# Patient Record
Sex: FEMALE | Race: WHITE | NOT HISPANIC OR LATINO | Employment: OTHER | URBAN - NONMETROPOLITAN AREA
[De-identification: names, ages, dates, MRNs, and addresses within clinical notes are randomized per-mention and may not be internally consistent; named-entity substitution may affect disease eponyms.]

---

## 2020-01-20 ENCOUNTER — OFFICE VISIT (OUTPATIENT)
Dept: OBSTETRICS AND GYNECOLOGY | Facility: CLINIC | Age: 65
End: 2020-01-20

## 2020-01-20 VITALS
SYSTOLIC BLOOD PRESSURE: 118 MMHG | HEIGHT: 63 IN | DIASTOLIC BLOOD PRESSURE: 72 MMHG | BODY MASS INDEX: 26.05 KG/M2 | WEIGHT: 147 LBS

## 2020-01-20 DIAGNOSIS — N95.1 MENOPAUSAL SYMPTOMS: Primary | ICD-10-CM

## 2020-01-20 DIAGNOSIS — R68.82 DECREASED LIBIDO: ICD-10-CM

## 2020-01-20 DIAGNOSIS — Z79.890 HORMONE REPLACEMENT THERAPY (HRT): ICD-10-CM

## 2020-01-20 DIAGNOSIS — N95.2 ATROPHIC VAGINITIS: ICD-10-CM

## 2020-01-20 DIAGNOSIS — Z78.9 NON-SMOKER: ICD-10-CM

## 2020-01-20 PROCEDURE — 99203 OFFICE O/P NEW LOW 30 MIN: CPT | Performed by: OBSTETRICS & GYNECOLOGY

## 2020-01-20 RX ORDER — ESTRADIOL AND NORETHINDRONE ACETATE 1; .5 MG/1; MG/1
1 TABLET, FILM COATED ORAL DAILY
COMMUNITY
Start: 2020-01-13 | End: 2020-01-20 | Stop reason: SDUPTHER

## 2020-01-20 RX ORDER — ESTRADIOL AND NORETHINDRONE ACETATE 1; .5 MG/1; MG/1
1 TABLET, FILM COATED ORAL DAILY
Qty: 90 TABLET | Refills: 1 | Status: SHIPPED | OUTPATIENT
Start: 2020-01-20 | End: 2020-02-05 | Stop reason: ALTCHOICE

## 2020-01-20 RX ORDER — HYDROXYZINE PAMOATE 25 MG/1
CAPSULE ORAL
COMMUNITY
Start: 2020-01-13 | End: 2022-05-06 | Stop reason: SDUPTHER

## 2020-01-20 RX ORDER — ESTRADIOL 10 UG/1
1 TABLET VAGINAL 2 TIMES WEEKLY
COMMUNITY
Start: 2019-12-29 | End: 2020-01-20

## 2020-01-20 RX ORDER — ESOMEPRAZOLE MAGNESIUM 40 MG/1
CAPSULE, DELAYED RELEASE ORAL
COMMUNITY
Start: 2020-01-04 | End: 2022-05-06 | Stop reason: SDUPTHER

## 2020-01-20 NOTE — PROGRESS NOTES
"Subjective   Kathe Cohen is a 64 y.o. female is here today as a self referral.    Patient presents, self-referred, to discuss hormonal replacement therapy.    History of Present Illness     She is well controlled in terms of her vasomotor symptoms on her current regimen.  However, she does have decreased libido.  She also feels that her vagina is \"\" floppy\"  She has no classic prolapse symptoms or incontinence.    The following portions of the patient's history were reviewed and updated as appropriate: allergies, current medications, past family history, past medical history, past social history, past surgical history and problem list.    Review of Systems   All other systems reviewed and are negative.      Objective   Physical Exam   Constitutional: She is oriented to person, place, and time. She appears well-developed and well-nourished.   HENT:   Head: Normocephalic and atraumatic.   Neck: Normal range of motion. Neck supple.   Abdominal: Soft. Bowel sounds are normal.   Genitourinary: Vagina normal and uterus normal. No vaginal discharge found.   Musculoskeletal: Normal range of motion.   Neurological: She is alert and oriented to person, place, and time.   Skin: Skin is warm and dry.   Psychiatric: She has a normal mood and affect. Her behavior is normal. Judgment and thought content normal.   Nursing note and vitals reviewed.        Assessment/Plan   Kathe was seen today for establish care.    Diagnoses and all orders for this visit:    Menopausal symptoms    Hormone replacement therapy (HRT)    Atrophic vaginitis    Decreased libido    Non-smoker    Other orders  -     conjugated estrogens (PREMARIN) 0.625 MG/GM vaginal cream; Insert  into the vagina 2 (Two) Times a Week.  -     MIMVEY 1-0.5 MG per tablet; Take 1 tablet by mouth Daily.      Continue combination HRT.  Referral to compound pharmacy for consideration of testosterone supplementation.  Change vaginal estrogen to cream.  Return office in 3 " months.  Call for concerns or questions.    Mikey Marx MD

## 2020-02-05 ENCOUNTER — TELEPHONE (OUTPATIENT)
Dept: OBSTETRICS AND GYNECOLOGY | Facility: CLINIC | Age: 65
End: 2020-02-05

## 2020-02-05 ENCOUNTER — DOCUMENTATION (OUTPATIENT)
Dept: OBSTETRICS AND GYNECOLOGY | Facility: CLINIC | Age: 65
End: 2020-02-05

## 2020-02-05 NOTE — TELEPHONE ENCOUNTER
Received recommendation from  Pharmacy per fax re: compounded hormone cream    Order okayed per provider to stop current premarin and mimvey and use recommendations per pharmacy.  Pt is called and notified

## 2020-04-15 ENCOUNTER — OFFICE VISIT (OUTPATIENT)
Dept: OBSTETRICS AND GYNECOLOGY | Facility: CLINIC | Age: 65
End: 2020-04-15

## 2020-04-15 DIAGNOSIS — R68.82 DECREASED LIBIDO: ICD-10-CM

## 2020-04-15 DIAGNOSIS — Z79.890 HORMONE REPLACEMENT THERAPY (HRT): ICD-10-CM

## 2020-04-15 DIAGNOSIS — N95.1 MENOPAUSAL SYMPTOMS: Primary | ICD-10-CM

## 2020-04-15 DIAGNOSIS — N95.2 ATROPHIC VAGINITIS: ICD-10-CM

## 2020-04-15 DIAGNOSIS — Z78.9 NON-SMOKER: ICD-10-CM

## 2020-04-15 DIAGNOSIS — N95.1 MENOPAUSAL STATE: Primary | ICD-10-CM

## 2020-04-15 PROCEDURE — 99213 OFFICE O/P EST LOW 20 MIN: CPT | Performed by: OBSTETRICS & GYNECOLOGY

## 2020-04-15 RX ORDER — CETIRIZINE HYDROCHLORIDE 10 MG/1
10 TABLET ORAL
COMMUNITY
End: 2022-05-06 | Stop reason: SDUPTHER

## 2020-04-15 NOTE — PROGRESS NOTES
Subjective   Kathe Cohen is a 64 y.o. female is here today for follow-up.    Patient presents today to discuss hormones.    History of Present Illness     This is done via telephone visit due to connectivity issues.  Previous office note is reviewed.  She is now using all compounded bioidentical hormones through [facility].  She has significant improvement of all of her symptoms.  She has no breast symptoms.  She is due for mammogram.    The following portions of the patient's history were reviewed and updated as appropriate: allergies, current medications, past family history, past medical history, past social history, past surgical history and problem list.    Review of Systems   All other systems reviewed and are negative.      Objective   Physical Exam   Constitutional: She is oriented to person, place, and time.   Neurological: She is alert and oriented to person, place, and time.   Psychiatric: She has a normal mood and affect. Her behavior is normal. Judgment and thought content normal.         Assessment/Plan   Kathe was seen today for menopause.    Diagnoses and all orders for this visit:    Menopausal symptoms  -     Mammo Screening Digital Tomosynthesis Bilateral With CAD; Future    Hormone replacement therapy (HRT)    Atrophic vaginitis    Decreased libido    Non-smoker    Will continue current regimen.  Call for concerns or questions.  Return office in 3 months.    You have chosen to receive care through a telephone visit. Do you consent to use a telephone visit for your medical care today? Yes  This visit has been rescheduled as a phone visit to comply with patient safety concerns in accordance with CDC recommendations. Total time of discussion was 20 minutes.    Mikey Marx MD

## 2020-05-06 ENCOUNTER — TELEPHONE (OUTPATIENT)
Dept: OBSTETRICS AND GYNECOLOGY | Facility: CLINIC | Age: 65
End: 2020-05-06

## 2020-05-06 DIAGNOSIS — N95.1 MENOPAUSAL STATE: ICD-10-CM

## 2020-05-06 NOTE — TELEPHONE ENCOUNTER
[facility] sent four requests for pts four compounded hormones to be filled. Her last visit with you was on 04/15/2020. Jaime has been pulled. Is it ok to fill those?

## 2020-07-07 ENCOUNTER — TELEPHONE (OUTPATIENT)
Dept: OBSTETRICS AND GYNECOLOGY | Facility: CLINIC | Age: 65
End: 2020-07-07

## 2020-07-07 DIAGNOSIS — N95.1 MENOPAUSAL STATE: ICD-10-CM

## 2020-07-07 NOTE — TELEPHONE ENCOUNTER
Spoke with Sharmin at [facility]. Informed her that Dr Marx approved the hormone increase with three refills. Increased dosage charted.

## 2020-09-04 ENCOUNTER — TELEPHONE (OUTPATIENT)
Dept: OBSTETRICS AND GYNECOLOGY | Facility: CLINIC | Age: 65
End: 2020-09-04

## 2020-09-04 NOTE — TELEPHONE ENCOUNTER
Sharmin from [facility] calls in regards to pt's HRT refills. Informed Sharmin Marx has only approved 3 refills and pt will need to make an apt for yearly before more refills can be sent. Sharmin will notify pt when pt picks up rx today.

## 2020-09-30 ENCOUNTER — OFFICE VISIT (OUTPATIENT)
Dept: OBSTETRICS AND GYNECOLOGY | Facility: CLINIC | Age: 65
End: 2020-09-30

## 2020-09-30 VITALS
BODY MASS INDEX: 26.93 KG/M2 | DIASTOLIC BLOOD PRESSURE: 80 MMHG | WEIGHT: 152 LBS | SYSTOLIC BLOOD PRESSURE: 120 MMHG | HEIGHT: 63 IN

## 2020-09-30 DIAGNOSIS — N95.1 MENOPAUSAL SYMPTOMS: Primary | ICD-10-CM

## 2020-09-30 DIAGNOSIS — Z79.890 HORMONE REPLACEMENT THERAPY (HRT): ICD-10-CM

## 2020-09-30 PROCEDURE — 99212 OFFICE O/P EST SF 10 MIN: CPT | Performed by: OBSTETRICS & GYNECOLOGY

## 2020-09-30 NOTE — PROGRESS NOTES
Subjective   Kathe Cohen is a 64 y.o. female is here today for follow-up.    Patient presents today to discuss hormone replacement therapy    History of Present Illness     She is having good results with bioidentical hormones through [facility].  She wishes to continue.  She is having no side effects.    They did add testosterone cream 3 months ago due to delayed orgasm.  She is noting some improvement on that.    The following portions of the patient's history were reviewed and updated as appropriate: allergies, current medications, past family history, past medical history, past social history, past surgical history and problem list.    Review of Systems   All other systems reviewed and are negative.      Objective   Physical Exam  Vitals signs and nursing note reviewed.   Constitutional:       Appearance: Normal appearance.   HENT:      Head: Normocephalic and atraumatic.   Neck:      Musculoskeletal: Normal range of motion and neck supple.   Cardiovascular:      Rate and Rhythm: Normal rate and regular rhythm.      Pulses: Normal pulses.      Heart sounds: Normal heart sounds.   Pulmonary:      Effort: Pulmonary effort is normal.      Breath sounds: Normal breath sounds.   Abdominal:      General: Abdomen is flat.      Palpations: Abdomen is soft.   Musculoskeletal: Normal range of motion.   Skin:     General: Skin is warm and dry.   Neurological:      General: No focal deficit present.      Mental Status: She is alert and oriented to person, place, and time.   Psychiatric:         Mood and Affect: Mood normal.         Behavior: Behavior normal.         Thought Content: Thought content normal.         Judgment: Judgment normal.           Assessment/Plan   Kathe was seen today for medication check.    Diagnoses and all orders for this visit:    Menopausal symptoms    Hormone replacement therapy (HRT)      Continue same hormone replacement therapy regimen.  Schedule mammogram.  Return office in 6 months for  annual exam.    Mikey Marx MD

## 2020-10-01 DIAGNOSIS — N95.1 MENOPAUSAL STATE: ICD-10-CM

## 2020-10-02 DIAGNOSIS — N95.1 MENOPAUSAL STATE: ICD-10-CM

## 2020-10-02 NOTE — TELEPHONE ENCOUNTER
Sharmin from [facility] calls in regards to pt's compounds. After speaking with Amairani Concepcion, will call in RF's x3 for testosterone and HRT based on office note from 9/30/2020.

## 2020-10-12 DIAGNOSIS — N95.1 MENOPAUSAL STATE: ICD-10-CM

## 2020-10-16 DIAGNOSIS — N95.1 MENOPAUSAL STATE: ICD-10-CM

## 2020-10-16 NOTE — TELEPHONE ENCOUNTER
Called and spoke to Marysol at [facility] and informed her it was okay to have three extra vaginal inserts and increase testosterone to 1mg and Oxytocin 60u, voiced understanding. Pt informed, voiced understanding.

## 2020-10-22 ENCOUNTER — TELEPHONE (OUTPATIENT)
Dept: OBSTETRICS AND GYNECOLOGY | Facility: CLINIC | Age: 65
End: 2020-10-22

## 2020-11-23 DIAGNOSIS — N95.1 MENOPAUSAL STATE: ICD-10-CM

## 2020-11-23 NOTE — TELEPHONE ENCOUNTER
Pt has mammogram scheduled December 2020 at [facility], but needs refills on hormone creams until then.

## 2020-12-15 ENCOUNTER — TELEPHONE (OUTPATIENT)
Dept: OBSTETRICS AND GYNECOLOGY | Facility: CLINIC | Age: 65
End: 2020-12-15

## 2020-12-15 DIAGNOSIS — Z12.31 ENCOUNTER FOR SCREENING MAMMOGRAM FOR BREAST CANCER: ICD-10-CM

## 2020-12-15 DIAGNOSIS — Z12.31 ENCOUNTER FOR SCREENING MAMMOGRAM FOR BREAST CANCER: Primary | ICD-10-CM

## 2020-12-15 NOTE — TELEPHONE ENCOUNTER
Patient is scheduled for her mammogram at [facility], they have called requesting an order for patient to have completed. Patient appointment is scheduled for 930 today at [facility].  [facility] [phone] opt 3  Fax: [phone]

## 2020-12-22 DIAGNOSIS — N95.1 MENOPAUSAL STATE: ICD-10-CM

## 2021-01-04 ENCOUNTER — TELEPHONE (OUTPATIENT)
Dept: OBSTETRICS AND GYNECOLOGY | Facility: CLINIC | Age: 66
End: 2021-01-04

## 2021-01-06 NOTE — TELEPHONE ENCOUNTER
Please check on this.   The medication record indicates Dr. Marx prescribed medication with refills in late 12/2020

## 2021-01-12 ENCOUNTER — OFFICE VISIT (OUTPATIENT)
Dept: OBSTETRICS AND GYNECOLOGY | Facility: CLINIC | Age: 66
End: 2021-01-12

## 2021-01-12 VITALS
SYSTOLIC BLOOD PRESSURE: 102 MMHG | DIASTOLIC BLOOD PRESSURE: 66 MMHG | HEIGHT: 63 IN | WEIGHT: 158 LBS | BODY MASS INDEX: 28 KG/M2

## 2021-01-12 DIAGNOSIS — Z78.9 NON-SMOKER: ICD-10-CM

## 2021-01-12 DIAGNOSIS — Z79.890 HORMONE REPLACEMENT THERAPY (HRT): ICD-10-CM

## 2021-01-12 DIAGNOSIS — N95.1 MENOPAUSAL STATE: Primary | ICD-10-CM

## 2021-01-12 PROCEDURE — 99213 OFFICE O/P EST LOW 20 MIN: CPT | Performed by: NURSE PRACTITIONER

## 2021-01-12 NOTE — PROGRESS NOTES
"Subjective   Kathe Cohen is a 65 y.o. female.     Follow up  HRT  Compounded from  pharmacy      The following portions of the patient's history were reviewed and updated as appropriate: allergies, current medications, past family history, past medical history, past social history, past surgical history and problem list.    /66   Ht 160 cm (63\")   Wt 71.7 kg (158 lb)   BMI 27.99 kg/m²     Review of Systems   Constitutional: Negative for activity change, appetite change, fatigue and fever.   Respiratory: Negative for apnea and shortness of breath.    Cardiovascular: Negative for chest pain and palpitations.   Gastrointestinal: Negative for abdominal distention, abdominal pain, constipation, diarrhea, nausea and vomiting.   Endocrine: Negative for cold intolerance and heat intolerance.   Genitourinary: Negative for difficulty urinating, frequency, menstrual problem, pelvic pain, vaginal discharge and vaginal pain.   Neurological: Negative for headaches.   Psychiatric/Behavioral: Negative for agitation and sleep disturbance.       Objective   Physical Exam  Vitals signs reviewed.   Constitutional:       Appearance: She is well-developed.   Eyes:      General:         Right eye: No discharge.         Left eye: No discharge.   Cardiovascular:      Rate and Rhythm: Normal rate and regular rhythm.   Pulmonary:      Effort: Pulmonary effort is normal.      Breath sounds: Normal breath sounds.   Skin:     General: Skin is warm.   Neurological:      Mental Status: She is alert and oriented to person, place, and time.   Psychiatric:         Behavior: Behavior normal.         Thought Content: Thought content normal.         Judgment: Judgment normal.         Assessment/Plan    Pt reports compounded HRT is working well.   Pt desires to continue HRT at current dose.   Discussed risks, benefits and alternatives, pt voiced understanding and opts to continue HRT at current dose.  Controlled substance contract reviewed and " signed. TOSHA obtained and reviewed.   Will refill each.    Patient's Body mass index is 27.99 kg/m². BMI is above normal parameters. Recommendations include: educational material.    RV 3 months/prn.   Diagnoses and all orders for this visit:    1. Menopausal state (Primary)    2. Hormone replacement therapy (HRT)    3. Non-smoker    4. BMI 28.0-28.9,adult

## 2021-01-12 NOTE — TELEPHONE ENCOUNTER
Spoke with Kameron at [facility], this was not ever called in on Dec 22nd.  Gave verbal for compounds to be filled with refills until March under Dr. Marx per previous note.

## 2021-01-24 NOTE — PATIENT INSTRUCTIONS
"BMI for Adults  What is BMI?  Body mass index (BMI) is a number that is calculated from a person's weight and height. BMI can help estimate how much of a person's weight is composed of fat. BMI does not measure body fat directly. Rather, it is an alternative to procedures that directly measure body fat, which can be difficult and expensive.  BMI can help identify people who may be at higher risk for certain medical problems.  What are BMI measurements used for?  BMI is used as a screening tool to identify possible weight problems. It helps determine whether a person is obese, overweight, a healthy weight, or underweight.  BMI is useful for:  · Identifying a weight problem that may be related to a medical condition or may increase the risk for medical problems.  · Promoting changes, such as changes in diet and exercise, to help reach a healthy weight. BMI screening can be repeated to see if these changes are working.  How is BMI calculated?  BMI involves measuring your weight in relation to your height. Both height and weight are measured, and the BMI is calculated from those numbers. This can be done either in English (U.S[place] or metric measurements. Note that charts and online BMI calculators are available to help you find your BMI quickly and easily without having to do these calculations yourself.  To calculate your BMI in English (U.S.) measurements:    1. Measure your weight in pounds (lb).  2. Multiply the number of pounds by 703.  ? For example, for a person who weighs 180 lb, multiply that number by 703, which equals 126,540.  3. Measure your height in inches. Then multiply that number by itself to get a measurement called \"inches squared.\"  ? For example, for a person who is 70 inches tall, the \"inches squared\" measurement is 70 inches x 70 inches, which equals 4,900 inches squared.  4. Divide the total from step 2 (number of lb x 703) by the total from step 3 (inches squared): 126,540 ÷ 4,900 = 25.8. This is " "your BMI.  To calculate your BMI in metric measurements:  1. Measure your weight in kilograms (kg).  2. Measure your height in meters (m). Then multiply that number by itself to get a measurement called \"meters squared.\"  ? For example, for a person who is 1.75 m tall, the \"meters squared\" measurement is 1.75 m x 1.75 m, which is equal to 3.1 meters squared.  3. Divide the number of kilograms (your weight) by the meters squared number. In this example: 70 ÷ 3.1 = 22.6. This is your BMI.  What do the results mean?  BMI charts are used to identify whether you are underweight, normal weight, overweight, or obese. The following guidelines will be used:  · Underweight: BMI less than 18.5.  · Normal weight: BMI between 18.5 and 24.9.  · Overweight: BMI between 25 and 29.9.  · Obese: BMI of 30 or above.  Keep these notes in mind:  · Weight includes both fat and muscle, so someone with a muscular build, such as an athlete, may have a BMI that is higher than 24.9. In cases like these, BMI is not an accurate measure of body fat.  · To determine if excess body fat is the cause of a BMI of 25 or higher, further assessments may need to be done by a health care provider.  · BMI is usually interpreted in the same way for men and women.  Where to find more information  For more information about BMI, including tools to quickly calculate your BMI, go to these websites:  · Centers for Disease Control and Prevention: www.cdc.gov  · American Heart Association: www.heart.org  · National Heart, Lung, and Blood [place]: www.nhlbi.nih.gov  Summary  · Body mass index (BMI) is a number that is calculated from a person's weight and height.  · BMI may help estimate how much of a person's weight is composed of fat. BMI can help identify those who may be at higher risk for certain medical problems.  · BMI can be measured using English measurements or metric measurements.  · BMI charts are used to identify whether you are underweight, normal " weight, overweight, or obese.  This information is not intended to replace advice given to you by your health care provider. Make sure you discuss any questions you have with your health care provider.  Document Revised: 09/09/2020 Document Reviewed: 07/17/2020  Elsevier Patient Education © 2020 Elsevier Inc.

## 2021-03-15 ENCOUNTER — TELEPHONE (OUTPATIENT)
Dept: OBSTETRICS AND GYNECOLOGY | Facility: CLINIC | Age: 66
End: 2021-03-15

## 2021-03-15 NOTE — TELEPHONE ENCOUNTER
Patient spoke with [facility], wanting to get a change on compounded medication.  Per pharmacist, they spoke with patient and wanting to get testosterone increased to 2mg topically per day due decreased libido.  Also wanting to increase the biest cream to 1mg 50/50 daily due to night sweats along with adding BAGA 10mg topically.  I advised pharmacy that patient should have enough to last until her appointment on March 31st which at that time will be discussed on increasing unless you want to do differently by increasing now.  Please advise

## 2021-03-15 NOTE — TELEPHONE ENCOUNTER
Spoke with patient and advised her that we would discuss increasing the compound at her upcoming appointment, pt voiced understanding.  I spoke with Kameron at [facility] as well to double check that patient has a refill there that will last her until appointment, per Kameron she has a refill for all compound creams.

## 2021-03-31 ENCOUNTER — OFFICE VISIT (OUTPATIENT)
Dept: OBSTETRICS AND GYNECOLOGY | Facility: CLINIC | Age: 66
End: 2021-03-31

## 2021-03-31 VITALS
DIASTOLIC BLOOD PRESSURE: 70 MMHG | HEIGHT: 63 IN | WEIGHT: 156 LBS | SYSTOLIC BLOOD PRESSURE: 118 MMHG | BODY MASS INDEX: 27.64 KG/M2

## 2021-03-31 DIAGNOSIS — E66.3 OVERWEIGHT WITH BODY MASS INDEX (BMI) OF 27 TO 27.9 IN ADULT: ICD-10-CM

## 2021-03-31 DIAGNOSIS — Z78.9 NON-SMOKER: ICD-10-CM

## 2021-03-31 DIAGNOSIS — Z01.419 ENCOUNTER FOR GYNECOLOGICAL EXAMINATION WITHOUT ABNORMAL FINDING: Primary | ICD-10-CM

## 2021-03-31 DIAGNOSIS — Z12.31 ENCOUNTER FOR SCREENING MAMMOGRAM FOR MALIGNANT NEOPLASM OF BREAST: ICD-10-CM

## 2021-03-31 DIAGNOSIS — N95.1 MENOPAUSAL STATE: ICD-10-CM

## 2021-03-31 PROCEDURE — 87624 HPV HI-RISK TYP POOLED RSLT: CPT | Performed by: NURSE PRACTITIONER

## 2021-03-31 PROCEDURE — 99396 PREV VISIT EST AGE 40-64: CPT | Performed by: NURSE PRACTITIONER

## 2021-03-31 PROCEDURE — G0123 SCREEN CERV/VAG THIN LAYER: HCPCS | Performed by: NURSE PRACTITIONER

## 2021-03-31 NOTE — PATIENT INSTRUCTIONS
"BMI for Adults  What is BMI?  Body mass index (BMI) is a number that is calculated from a person's weight and height. BMI can help estimate how much of a person's weight is composed of fat. BMI does not measure body fat directly. Rather, it is an alternative to procedures that directly measure body fat, which can be difficult and expensive.  BMI can help identify people who may be at higher risk for certain medical problems.  What are BMI measurements used for?  BMI is used as a screening tool to identify possible weight problems. It helps determine whether a person is obese, overweight, a healthy weight, or underweight.  BMI is useful for:  · Identifying a weight problem that may be related to a medical condition or may increase the risk for medical problems.  · Promoting changes, such as changes in diet and exercise, to help reach a healthy weight. BMI screening can be repeated to see if these changes are working.  How is BMI calculated?  BMI involves measuring your weight in relation to your height. Both height and weight are measured, and the BMI is calculated from those numbers. This can be done either in English (U.S.) or metric measurements. Note that charts and online BMI calculators are available to help you find your BMI quickly and easily without having to do these calculations yourself.  To calculate your BMI in English (U.S.) measurements:    1. Measure your weight in pounds (lb).  2. Multiply the number of pounds by 703.  ? For example, for a person who weighs 180 lb, multiply that number by 703, which equals 126,540.  3. Measure your height in inches. Then multiply that number by itself to get a measurement called \"inches squared.\"  ? For example, for a person who is 70 inches tall, the \"inches squared\" measurement is 70 inches x 70 inches, which equals 4,900 inches squared.  4. Divide the total from step 2 (number of lb x 703) by the total from step 3 (inches squared): 126,540 ÷ 4,900 = 25.8. This is " "your BMI.  To calculate your BMI in metric measurements:  1. Measure your weight in kilograms (kg).  2. Measure your height in meters (m). Then multiply that number by itself to get a measurement called \"meters squared.\"  ? For example, for a person who is 1.75 m tall, the \"meters squared\" measurement is 1.75 m x 1.75 m, which is equal to 3.1 meters squared.  3. Divide the number of kilograms (your weight) by the meters squared number. In this example: 70 ÷ 3.1 = 22.6. This is your BMI.  What do the results mean?  BMI charts are used to identify whether you are underweight, normal weight, overweight, or obese. The following guidelines will be used:  · Underweight: BMI less than 18.5.  · Normal weight: BMI between 18.5 and 24.9.  · Overweight: BMI between 25 and 29.9.  · Obese: BMI of 30 or above.  Keep these notes in mind:  · Weight includes both fat and muscle, so someone with a muscular build, such as an athlete, may have a BMI that is higher than 24.9. In cases like these, BMI is not an accurate measure of body fat.  · To determine if excess body fat is the cause of a BMI of 25 or higher, further assessments may need to be done by a health care provider.  · BMI is usually interpreted in the same way for men and women.  Where to find more information  For more information about BMI, including tools to quickly calculate your BMI, go to these websites:  · Centers for Disease Control and Prevention: www.cdc.gov  · American Heart Association: www.heart.org  · National Heart, Lung, and Blood [place]: www.nhlbi.nih.gov  Summary  · Body mass index (BMI) is a number that is calculated from a person's weight and height.  · BMI may help estimate how much of a person's weight is composed of fat. BMI can help identify those who may be at higher risk for certain medical problems.  · BMI can be measured using English measurements or metric measurements.  · BMI charts are used to identify whether you are underweight, normal " weight, overweight, or obese.  This information is not intended to replace advice given to you by your health care provider. Make sure you discuss any questions you have with your health care provider.  Document Revised: 09/09/2020 Document Reviewed: 07/17/2020  Elsevier Patient Education © 2021 Elsevier Inc.

## 2021-03-31 NOTE — PROGRESS NOTES
"Kendall Brito is a 65 y.o. female.     Annual exam      The following portions of the patient's history were reviewed and updated as appropriate: allergies, current medications, past family history, past medical history, past social history, past surgical history and problem list.    /70   Ht 160 cm (63\")   Wt 70.8 kg (156 lb)   BMI 27.63 kg/m²     Review of Systems   Constitutional: Negative for activity change, appetite change, fatigue and fever.   HENT: Negative for congestion, sore throat and trouble swallowing.    Eyes: Negative for pain, discharge and visual disturbance.   Respiratory: Negative for apnea, shortness of breath and wheezing.    Cardiovascular: Negative for chest pain, palpitations and leg swelling.   Gastrointestinal: Negative for abdominal pain, constipation and diarrhea.   Genitourinary: Negative for frequency, pelvic pain, urgency and vaginal discharge.   Musculoskeletal: Negative for back pain and gait problem.   Skin: Negative for color change and rash.   Neurological: Negative for dizziness, weakness and numbness.   Psychiatric/Behavioral: Negative for confusion and sleep disturbance.       Objective   Physical Exam  Vitals and nursing note reviewed. Exam conducted with a chaperone present.   Constitutional:       General: She is not in acute distress.     Appearance: She is well-developed. She is not diaphoretic.   HENT:      Head: Normocephalic.      Right Ear: External ear normal.      Left Ear: External ear normal.      Nose: Nose normal.   Eyes:      General: No scleral icterus.        Right eye: No discharge.         Left eye: No discharge.      Conjunctiva/sclera: Conjunctivae normal.      Pupils: Pupils are equal, round, and reactive to light.   Neck:      Thyroid: No thyromegaly.      Vascular: No carotid bruit.      Trachea: No tracheal deviation.   Cardiovascular:      Rate and Rhythm: Normal rate and regular rhythm.      Heart sounds: Normal heart sounds. " No murmur heard.     Pulmonary:      Effort: Pulmonary effort is normal. No respiratory distress.      Breath sounds: Normal breath sounds. No wheezing.   Chest:      Breasts: Breasts are symmetrical.         Right: Normal. No swelling, bleeding, inverted nipple, mass, nipple discharge, skin change or tenderness.         Left: Normal. No swelling, bleeding, inverted nipple, mass, nipple discharge, skin change or tenderness.   Abdominal:      General: There is no distension.      Palpations: Abdomen is soft. There is no mass.      Tenderness: There is no abdominal tenderness. There is no right CVA tenderness, left CVA tenderness or guarding.      Hernia: No hernia is present. There is no hernia in the left inguinal area or right inguinal area.   Genitourinary:     General: Normal vulva.      Exam position: Lithotomy position.      Labia:         Right: No rash, tenderness, lesion or injury.         Left: No rash, tenderness, lesion or injury.       Vagina: Normal. No signs of injury and foreign body. No vaginal discharge, erythema, tenderness or bleeding.      Cervix: Normal.      Uterus: Normal. Not enlarged, not fixed and not tender.       Adnexa: Right adnexa normal and left adnexa normal.        Right: No mass, tenderness or fullness.          Left: No mass, tenderness or fullness.        Rectum: Normal. No mass.      Comments:   BSU normal  Urethral meatus  Normal  Perineum  Normal  Musculoskeletal:         General: No tenderness. Normal range of motion.      Cervical back: Normal range of motion and neck supple.   Lymphadenopathy:      Head:      Right side of head: No submental, submandibular, tonsillar, preauricular, posterior auricular or occipital adenopathy.      Left side of head: No submental, submandibular, tonsillar, preauricular, posterior auricular or occipital adenopathy.      Cervical: No cervical adenopathy.      Right cervical: No superficial, deep or posterior cervical adenopathy.     Left  cervical: No superficial, deep or posterior cervical adenopathy.      Upper Body:      Right upper body: No supraclavicular, axillary or pectoral adenopathy.      Left upper body: No supraclavicular, axillary or pectoral adenopathy.      Lower Body: No right inguinal adenopathy. No left inguinal adenopathy.   Skin:     General: Skin is warm and dry.      Findings: No bruising, erythema or rash.   Neurological:      Mental Status: She is alert and oriented to person, place, and time.      Coordination: Coordination normal.   Psychiatric:         Mood and Affect: Mood normal.         Behavior: Behavior normal.         Thought Content: Thought content normal.         Judgment: Judgment normal.         Assessment/Plan    Well woman GYN exam.   Pap smear done per ASCCP guidelines.   Will have lab work at PCP.     Encouraged SBE, pt is aware how to do self breast exam and the importance of same.   Discussed weight management and importance of maintaining a healthy weight.   Discussed Vitamin D intake and the importance of adequate vitamin D for both Bone Health and a healthy immune system.    Discussed Daily exercise and the importance of same, in regards to a healthy heart as well as helping to maintain her weight and improving her mental health.     Colonoscopy, pt declines r/t testing PCP has done 2 yrs ago.      Bone density, PCP ordered.     Discussed STD prevention and testing.   Pt declined STD testing.     Mammogram will be scheduled at [facility] per pt request.     Pt desires to continue HRT with adjustments recommended by [facility] for her continued hot flashes and difficulty sleeping.   Discussed risks, benefits and alternatives, pt voiced understanding and opts to continue HRT  [facility] called to refill HRT with adjustments.   Reviewed S&S to report, pt voiced understanding.     Controlled substance contract reviewed and signed. TOSHA obtained and reviewed.     Patient's Body mass index is  27.63 kg/m². BMI is within normal parameters. No follow-up required.    RV annual exam/  Diagnoses and all orders for this visit:    1. Encounter for gynecological examination without abnormal finding (Primary)  -     Liquid-based Pap Smear, Screening  -     HPV DNA Probe, Direct - ThinPrep Vial, Cervix    2. Encounter for screening mammogram for malignant neoplasm of breast  -     Mammo Screening Digital Tomosynthesis Bilateral With CAD; Future    3. Overweight with body mass index (BMI) of 27 to 27.9 in adult    4. Non-smoker

## 2021-03-31 NOTE — TELEPHONE ENCOUNTER
Called in to Karolina at [facility] with increase on Testosterone to 2mg/ml 30 day supply nrf

## 2021-04-01 LAB
GEN CATEG CVX/VAG CYTO-IMP: ABNORMAL
HPV I/H RISK 4 DNA CVX QL PROBE+SIG AMP: NOT DETECTED
LAB AP CASE REPORT: ABNORMAL
LAB AP GYN ADDITIONAL INFORMATION: ABNORMAL
PATH INTERP SPEC-IMP: ABNORMAL
STAT OF ADQ CVX/VAG CYTO-IMP: ABNORMAL

## 2021-05-11 DIAGNOSIS — N95.1 MENOPAUSAL STATE: ICD-10-CM

## 2021-05-11 NOTE — TELEPHONE ENCOUNTER
Monse Skinner has sent over a request for refill on patients testosterone 2mg/ml cream, apply 1ml QD    Patients last appointment was 3/31/21, next appointment 6/30/21

## 2021-05-13 DIAGNOSIS — N95.1 MENOPAUSAL STATE: ICD-10-CM

## 2021-05-13 NOTE — TELEPHONE ENCOUNTER
Pt states the test cream & Oxytocin got sent in to [facility] instead of [facility], please sent to [facility]. LOV 3/31/21 & has appt 6/30/21. I've pend med.

## 2021-06-14 DIAGNOSIS — N95.1 MENOPAUSAL STATE: ICD-10-CM

## 2021-06-14 NOTE — TELEPHONE ENCOUNTER
Patient called requesting rf. Patients last refill was 5/13/21. Patient states she only needs the three that are pended.

## 2021-06-30 ENCOUNTER — TELEMEDICINE (OUTPATIENT)
Dept: OBSTETRICS AND GYNECOLOGY | Facility: CLINIC | Age: 66
End: 2021-06-30

## 2021-06-30 DIAGNOSIS — Z79.890 POSTMENOPAUSAL HRT (HORMONE REPLACEMENT THERAPY): ICD-10-CM

## 2021-06-30 DIAGNOSIS — Z79.899 MEDICATION MANAGEMENT: Primary | ICD-10-CM

## 2021-06-30 PROCEDURE — 99213 OFFICE O/P EST LOW 20 MIN: CPT | Performed by: NURSE PRACTITIONER

## 2021-07-06 DIAGNOSIS — N95.1 MENOPAUSAL STATE: ICD-10-CM

## 2021-07-06 NOTE — TELEPHONE ENCOUNTER
PC needs RF on Test cream. Had Telemed appt 6/30/21. She is leaving to go out of town 7/13/21. Please advise. I've pend med.

## 2021-07-07 NOTE — TELEPHONE ENCOUNTER
Called in patients testosterone compound to [facility] pharmacy, 30 day nrf, spoke with Beth

## 2021-07-15 NOTE — PATIENT INSTRUCTIONS
"BMI for Adults  What is BMI?  Body mass index (BMI) is a number that is calculated from a person's weight and height. BMI can help estimate how much of a person's weight is composed of fat. BMI does not measure body fat directly. Rather, it is an alternative to procedures that directly measure body fat, which can be difficult and expensive.  BMI can help identify people who may be at higher risk for certain medical problems.  What are BMI measurements used for?  BMI is used as a screening tool to identify possible weight problems. It helps determine whether a person is obese, overweight, a healthy weight, or underweight.  BMI is useful for:  · Identifying a weight problem that may be related to a medical condition or may increase the risk for medical problems.  · Promoting changes, such as changes in diet and exercise, to help reach a healthy weight. BMI screening can be repeated to see if these changes are working.  How is BMI calculated?  BMI involves measuring your weight in relation to your height. Both height and weight are measured, and the BMI is calculated from those numbers. This can be done either in English (U.S.) or metric measurements. Note that charts and online BMI calculators are available to help you find your BMI quickly and easily without having to do these calculations yourself.  To calculate your BMI in English (U.S.) measurements:    1. Measure your weight in pounds (lb).  2. Multiply the number of pounds by 703.  ? For example, for a person who weighs 180 lb, multiply that number by 703, which equals 126,540.  3. Measure your height in inches. Then multiply that number by itself to get a measurement called \"inches squared.\"  ? For example, for a person who is 70 inches tall, the \"inches squared\" measurement is 70 inches x 70 inches, which equals 4,900 inches squared.  4. Divide the total from step 2 (number of lb x 703) by the total from step 3 (inches squared): 126,540 ÷ 4,900 = 25.8. This is " "your BMI.  To calculate your BMI in metric measurements:  1. Measure your weight in kilograms (kg).  2. Measure your height in meters (m). Then multiply that number by itself to get a measurement called \"meters squared.\"  ? For example, for a person who is 1.75 m tall, the \"meters squared\" measurement is 1.75 m x 1.75 m, which is equal to 3.1 meters squared.  3. Divide the number of kilograms (your weight) by the meters squared number. In this example: 70 ÷ 3.1 = 22.6. This is your BMI.  What do the results mean?  BMI charts are used to identify whether you are underweight, normal weight, overweight, or obese. The following guidelines will be used:  · Underweight: BMI less than 18.5.  · Normal weight: BMI between 18.5 and 24.9.  · Overweight: BMI between 25 and 29.9.  · Obese: BMI of 30 or above.  Keep these notes in mind:  · Weight includes both fat and muscle, so someone with a muscular build, such as an athlete, may have a BMI that is higher than 24.9. In cases like these, BMI is not an accurate measure of body fat.  · To determine if excess body fat is the cause of a BMI of 25 or higher, further assessments may need to be done by a health care provider.  · BMI is usually interpreted in the same way for men and women.  Where to find more information  For more information about BMI, including tools to quickly calculate your BMI, go to these websites:  · Centers for Disease Control and Prevention: www.cdc.gov  · American Heart Association: www.heart.org  · National Heart, Lung, and Blood [place]: www.nhlbi.nih.gov  Summary  · Body mass index (BMI) is a number that is calculated from a person's weight and height.  · BMI may help estimate how much of a person's weight is composed of fat. BMI can help identify those who may be at higher risk for certain medical problems.  · BMI can be measured using English measurements or metric measurements.  · BMI charts are used to identify whether you are underweight, normal " weight, overweight, or obese.  This information is not intended to replace advice given to you by your health care provider. Make sure you discuss any questions you have with your health care provider.  Document Revised: 09/09/2020 Document Reviewed: 07/17/2020  Elsevier Patient Education © 2021 Elsevier Inc.

## 2021-08-13 DIAGNOSIS — N95.1 MENOPAUSAL STATE: ICD-10-CM

## 2021-08-13 NOTE — TELEPHONE ENCOUNTER
Called in refills on compounds listed, progesterone still has a refill, spoke with Darby at [place]

## 2021-09-15 ENCOUNTER — TELEPHONE (OUTPATIENT)
Dept: OBSTETRICS AND GYNECOLOGY | Facility: CLINIC | Age: 66
End: 2021-09-15

## 2021-09-24 ENCOUNTER — OFFICE VISIT (OUTPATIENT)
Dept: OBSTETRICS AND GYNECOLOGY | Facility: CLINIC | Age: 66
End: 2021-09-24

## 2021-09-24 VITALS
WEIGHT: 148 LBS | HEIGHT: 63 IN | BODY MASS INDEX: 26.22 KG/M2 | SYSTOLIC BLOOD PRESSURE: 118 MMHG | DIASTOLIC BLOOD PRESSURE: 72 MMHG

## 2021-09-24 DIAGNOSIS — Z79.890 POSTMENOPAUSAL HRT (HORMONE REPLACEMENT THERAPY): ICD-10-CM

## 2021-09-24 DIAGNOSIS — N95.1 MENOPAUSAL SYMPTOMS: ICD-10-CM

## 2021-09-24 DIAGNOSIS — Z79.899 MEDICATION MANAGEMENT: Primary | ICD-10-CM

## 2021-09-24 DIAGNOSIS — Z78.9 NON-SMOKER: ICD-10-CM

## 2021-09-24 DIAGNOSIS — E66.3 OVERWEIGHT WITH BODY MASS INDEX (BMI) OF 26 TO 26.9 IN ADULT: ICD-10-CM

## 2021-09-24 PROCEDURE — 99213 OFFICE O/P EST LOW 20 MIN: CPT | Performed by: NURSE PRACTITIONER

## 2021-10-14 NOTE — PATIENT INSTRUCTIONS
"BMI for Adults  What is BMI?  Body mass index (BMI) is a number that is calculated from a person's weight and height. BMI can help estimate how much of a person's weight is composed of fat. BMI does not measure body fat directly. Rather, it is an alternative to procedures that directly measure body fat, which can be difficult and expensive.  BMI can help identify people who may be at higher risk for certain medical problems.  What are BMI measurements used for?  BMI is used as a screening tool to identify possible weight problems. It helps determine whether a person is obese, overweight, a healthy weight, or underweight.  BMI is useful for:  · Identifying a weight problem that may be related to a medical condition or may increase the risk for medical problems.  · Promoting changes, such as changes in diet and exercise, to help reach a healthy weight. BMI screening can be repeated to see if these changes are working.  How is BMI calculated?  BMI involves measuring your weight in relation to your height. Both height and weight are measured, and the BMI is calculated from those numbers. This can be done either in English (U.S.) or metric measurements. Note that charts and online BMI calculators are available to help you find your BMI quickly and easily without having to do these calculations yourself.  To calculate your BMI in English (U.S.) measurements:    1. Measure your weight in pounds (lb).  2. Multiply the number of pounds by 703.  ? For example, for a person who weighs 180 lb, multiply that number by 703, which equals 126,540.  3. Measure your height in inches. Then multiply that number by itself to get a measurement called \"inches squared.\"  ? For example, for a person who is 70 inches tall, the \"inches squared\" measurement is 70 inches x 70 inches, which equals 4,900 inches squared.  4. Divide the total from step 2 (number of lb x 703) by the total from step 3 (inches squared): 126,540 ÷ 4,900 = 25.8. This is " "your BMI.    To calculate your BMI in metric measurements:  1. Measure your weight in kilograms (kg).  2. Measure your height in meters (m). Then multiply that number by itself to get a measurement called \"meters squared.\"  ? For example, for a person who is 1.75 m tall, the \"meters squared\" measurement is 1.75 m x 1.75 m, which is equal to 3.1 meters squared.  3. Divide the number of kilograms (your weight) by the meters squared number. In this example: 70 ÷ 3.1 = 22.6. This is your BMI.  What do the results mean?  BMI charts are used to identify whether you are underweight, normal weight, overweight, or obese. The following guidelines will be used:  · Underweight: BMI less than 18.5.  · Normal weight: BMI between 18.5 and 24.9.  · Overweight: BMI between 25 and 29.9.  · Obese: BMI of 30 or above.  Keep these notes in mind:  · Weight includes both fat and muscle, so someone with a muscular build, such as an athlete, may have a BMI that is higher than 24.9. In cases like these, BMI is not an accurate measure of body fat.  · To determine if excess body fat is the cause of a BMI of 25 or higher, further assessments may need to be done by a health care provider.  · BMI is usually interpreted in the same way for men and women.  Where to find more information  For more information about BMI, including tools to quickly calculate your BMI, go to these websites:  · Centers for Disease Control and Prevention: www.cdc.gov  · American Heart Association: www.heart.org  · National Heart, Lung, and Blood [place]: www.nhlbi.nih.gov  Summary  · Body mass index (BMI) is a number that is calculated from a person's weight and height.  · BMI may help estimate how much of a person's weight is composed of fat. BMI can help identify those who may be at higher risk for certain medical problems.  · BMI can be measured using English measurements or metric measurements.  · BMI charts are used to identify whether you are underweight, normal " weight, overweight, or obese.  This information is not intended to replace advice given to you by your health care provider. Make sure you discuss any questions you have with your health care provider.  Document Revised: 09/09/2020 Document Reviewed: 07/17/2020  Elsevier Patient Education © 2021 Elsevier Inc.

## 2021-10-15 DIAGNOSIS — N95.1 MENOPAUSAL STATE: ICD-10-CM

## 2021-10-15 NOTE — TELEPHONE ENCOUNTER
[facility] has sent over a request for pts testosterone cream as well as her estriol cream     Called in to Beth at [facility].

## 2021-11-15 DIAGNOSIS — N95.1 MENOPAUSAL STATE: ICD-10-CM

## 2021-11-15 NOTE — TELEPHONE ENCOUNTER
Pt requesting refill on testosterone cream.  Pt has appt 12/30/21.  I do not see a testosterone level for this pt.   Please advise.

## 2021-12-20 DIAGNOSIS — N95.1 MENOPAUSAL STATE: ICD-10-CM

## 2021-12-20 NOTE — TELEPHONE ENCOUNTER
Pt called office for refill on hormone compounds.  Pt has office visit scheduled for 12/30/21.   Medications pended.

## 2022-01-06 ENCOUNTER — TELEMEDICINE (OUTPATIENT)
Dept: OBSTETRICS AND GYNECOLOGY | Facility: CLINIC | Age: 67
End: 2022-01-06

## 2022-01-06 DIAGNOSIS — Z12.31 ENCOUNTER FOR SCREENING MAMMOGRAM FOR MALIGNANT NEOPLASM OF BREAST: ICD-10-CM

## 2022-01-06 DIAGNOSIS — Z79.890 POSTMENOPAUSAL HRT (HORMONE REPLACEMENT THERAPY): Primary | ICD-10-CM

## 2022-01-06 DIAGNOSIS — Z78.9 NON-SMOKER: ICD-10-CM

## 2022-01-06 DIAGNOSIS — Z79.899 MEDICATION MANAGEMENT: ICD-10-CM

## 2022-01-06 PROCEDURE — 99213 OFFICE O/P EST LOW 20 MIN: CPT | Performed by: NURSE PRACTITIONER

## 2022-01-06 NOTE — PROGRESS NOTES
Subjective   Kathe Brito is a 66 y.o. female.     Follow up  Med management  HRT    This was an audio and video enabled telemedicine encounter.        The following portions of the patient's history were reviewed and updated as appropriate: allergies, current medications, past family history, past medical history, past social history, past surgical history and problem list.    There were no vitals taken for this visit.    Review of Systems   Constitutional: Negative for activity change, appetite change, fatigue and fever.   Respiratory: Negative for apnea and shortness of breath.    Cardiovascular: Negative for chest pain and palpitations.   Gastrointestinal: Negative for abdominal distention, abdominal pain, constipation, diarrhea, nausea and vomiting.   Endocrine: Negative for cold intolerance and heat intolerance.   Genitourinary: Negative for difficulty urinating, frequency, menstrual problem, pelvic pain, vaginal discharge and vaginal pain.        Pt reports menopausal symptoms are controlled with current HRT     Neurological: Negative for headaches.   Psychiatric/Behavioral: Negative for agitation and sleep disturbance.       Objective   Physical Exam  Pulmonary:      Effort: Pulmonary effort is normal.   Neurological:      Mental Status: She is alert and oriented to person, place, and time.         Assessment/Plan    Pt desires to continue HRT at current dose.   Discussed risks, benefits and alternatives, pt voiced understanding and opts to continue HRT at current dose.    Pt reports she is running out of the vaginal cream prior to refill being due.   Will check quantity with pharmacy.     Mammogram ordered, pt to schedule at [facility] per pt request.     Patient's There is no height or weight on file to calculate BMI.     RV annual exam/prn.   Diagnoses and all orders for this visit:    1. Postmenopausal HRT (hormone replacement therapy) (Primary)    2. Medication management    3. Non-smoker    4.  Encounter for screening mammogram for malignant neoplasm of breast  -     Mammo Screening Digital Tomosynthesis Bilateral With CAD; Future

## 2022-01-06 NOTE — PATIENT INSTRUCTIONS
"BMI for Adults  What is BMI?  Body mass index (BMI) is a number that is calculated from a person's weight and height. BMI can help estimate how much of a person's weight is composed of fat. BMI does not measure body fat directly. Rather, it is an alternative to procedures that directly measure body fat, which can be difficult and expensive.  BMI can help identify people who may be at higher risk for certain medical problems.  What are BMI measurements used for?  BMI is used as a screening tool to identify possible weight problems. It helps determine whether a person is obese, overweight, a healthy weight, or underweight.  BMI is useful for:  · Identifying a weight problem that may be related to a medical condition or may increase the risk for medical problems.  · Promoting changes, such as changes in diet and exercise, to help reach a healthy weight. BMI screening can be repeated to see if these changes are working.  How is BMI calculated?  BMI involves measuring your weight in relation to your height. Both height and weight are measured, and the BMI is calculated from those numbers. This can be done either in English (U.S.) or metric measurements. Note that charts and online BMI calculators are available to help you find your BMI quickly and easily without having to do these calculations yourself.  To calculate your BMI in English (U.S.) measurements:    1. Measure your weight in pounds (lb).  2. Multiply the number of pounds by 703.  ? For example, for a person who weighs 180 lb, multiply that number by 703, which equals 126,540.  3. Measure your height in inches. Then multiply that number by itself to get a measurement called \"inches squared.\"  ? For example, for a person who is 70 inches tall, the \"inches squared\" measurement is 70 inches x 70 inches, which equals 4,900 inches squared.  4. Divide the total from step 2 (number of lb x 703) by the total from step 3 (inches squared): 126,540 ÷ 4,900 = 25.8. This is " "your BMI.    To calculate your BMI in metric measurements:  1. Measure your weight in kilograms (kg).  2. Measure your height in meters (m). Then multiply that number by itself to get a measurement called \"meters squared.\"  ? For example, for a person who is 1.75 m tall, the \"meters squared\" measurement is 1.75 m x 1.75 m, which is equal to 3.1 meters squared.  3. Divide the number of kilograms (your weight) by the meters squared number. In this example: 70 ÷ 3.1 = 22.6. This is your BMI.  What do the results mean?  BMI charts are used to identify whether you are underweight, normal weight, overweight, or obese. The following guidelines will be used:  · Underweight: BMI less than 18.5.  · Normal weight: BMI between 18.5 and 24.9.  · Overweight: BMI between 25 and 29.9.  · Obese: BMI of 30 or above.  Keep these notes in mind:  · Weight includes both fat and muscle, so someone with a muscular build, such as an athlete, may have a BMI that is higher than 24.9. In cases like these, BMI is not an accurate measure of body fat.  · To determine if excess body fat is the cause of a BMI of 25 or higher, further assessments may need to be done by a health care provider.  · BMI is usually interpreted in the same way for men and women.  Where to find more information  For more information about BMI, including tools to quickly calculate your BMI, go to these websites:  · Centers for Disease Control and Prevention: www.cdc.gov  · American Heart Association: www.heart.org  · National Heart, Lung, and Blood [place]: www.nhlbi.nih.gov  Summary  · Body mass index (BMI) is a number that is calculated from a person's weight and height.  · BMI may help estimate how much of a person's weight is composed of fat. BMI can help identify those who may be at higher risk for certain medical problems.  · BMI can be measured using English measurements or metric measurements.  · BMI charts are used to identify whether you are underweight, normal " weight, overweight, or obese.  This information is not intended to replace advice given to you by your health care provider. Make sure you discuss any questions you have with your health care provider.  Document Revised: 09/09/2020 Document Reviewed: 07/17/2020  Elsevier Patient Education © 2021 Elsevier Inc.

## 2022-01-13 ENCOUNTER — TELEPHONE (OUTPATIENT)
Dept: OBSTETRICS AND GYNECOLOGY | Facility: CLINIC | Age: 67
End: 2022-01-13

## 2022-01-13 DIAGNOSIS — N95.1 MENOPAUSAL STATE: ICD-10-CM

## 2022-01-13 NOTE — TELEPHONE ENCOUNTER
----- Message from ROWDY Yost sent at 1/6/2022 10:12 AM CST -----  Pt reports she is running out of the vaginal cream prior to refill being due. Please check quantity with pharmacy so pt will have enough.

## 2022-01-13 NOTE — TELEPHONE ENCOUNTER
On patients last telehealth appointment, she noted to Ivonne that her vaginal cream she was running out of prior to when it was due to be filled again. After speaking to patient, she has been calling in for a refill on the 30th day(which is the date she is due to be filled) and it has taken a few days to be able to get her creams.  Patient notified to call a few days ahead for a refill request and this should have her back in line to have enough to last to her next refill date.   Called in refills to [facility]

## 2022-01-13 NOTE — PROGRESS NOTES
See telephone encounter, pt advised to call a few days prior to refill to get the process started for compounds to be filled on due date.

## 2022-01-17 DIAGNOSIS — Z12.31 ENCOUNTER FOR SCREENING MAMMOGRAM FOR MALIGNANT NEOPLASM OF BREAST: ICD-10-CM

## 2022-03-22 DIAGNOSIS — N95.1 MENOPAUSAL STATE: ICD-10-CM

## 2022-03-22 NOTE — TELEPHONE ENCOUNTER
Pt called office requesting refills on all hormone replacements.  Pt has appt 04/01/22.  I do not see any labs for this past year.  Please advise

## 2022-03-24 LAB
CORTIS SERPL-MCNC: 8.8 UG/DL
DHEA-S SERPL-MCNC: 91.5 UG/DL (ref 20.4–186.6)
ESTRADIOL SERPL-MCNC: 8.3 PG/ML
FSH SERPL-ACNC: 81.3 MIU/ML
LH SERPL-ACNC: 68.3 MIU/ML
PROGEST SERPL-MCNC: 0.2 NG/ML
TESTOST SERPL-MCNC: 29 NG/DL (ref 3–67)

## 2022-03-30 ENCOUNTER — TELEPHONE (OUTPATIENT)
Dept: OBSTETRICS AND GYNECOLOGY | Facility: CLINIC | Age: 67
End: 2022-03-30

## 2022-03-30 NOTE — TELEPHONE ENCOUNTER
Pt called office requesting hormone refills.  Pt had an appt scheduled for 04/01/22, appt changed by our office to 04/27/22.  Labs have been faxed to pharmacy and when pharmacy called they state they have faxed hormone recommendation to office to be signed.  Please advise if hormones can be refilled at this time.

## 2022-04-28 ENCOUNTER — OFFICE VISIT (OUTPATIENT)
Dept: OBSTETRICS AND GYNECOLOGY | Facility: CLINIC | Age: 67
End: 2022-04-28

## 2022-04-28 VITALS
SYSTOLIC BLOOD PRESSURE: 120 MMHG | DIASTOLIC BLOOD PRESSURE: 70 MMHG | WEIGHT: 150 LBS | HEIGHT: 63 IN | BODY MASS INDEX: 26.58 KG/M2

## 2022-04-28 DIAGNOSIS — N95.1 MENOPAUSAL STATE: ICD-10-CM

## 2022-04-28 DIAGNOSIS — Z12.31 ENCOUNTER FOR SCREENING MAMMOGRAM FOR MALIGNANT NEOPLASM OF BREAST: ICD-10-CM

## 2022-04-28 DIAGNOSIS — R30.0 DYSURIA: ICD-10-CM

## 2022-04-28 DIAGNOSIS — Z01.419 WELL WOMAN EXAM WITH ROUTINE GYNECOLOGICAL EXAM: Primary | ICD-10-CM

## 2022-04-28 PROCEDURE — G0101 CA SCREEN;PELVIC/BREAST EXAM: HCPCS | Performed by: NURSE PRACTITIONER

## 2022-04-28 RX ORDER — NEOMYCIN SULFATE, POLYMYXIN B SULFATE, HYDROCORTISONE 3.5; 10000; 1 MG/ML; [USP'U]/ML; MG/ML
SOLUTION/ DROPS AURICULAR (OTIC) EVERY 8 HOURS SCHEDULED
COMMUNITY
Start: 2021-11-24 | End: 2022-05-11 | Stop reason: SDUPTHER

## 2022-04-28 RX ORDER — LEVOFLOXACIN 500 MG/1
TABLET, FILM COATED ORAL EVERY 24 HOURS
COMMUNITY
Start: 2022-01-17 | End: 2022-04-28

## 2022-04-28 RX ORDER — HYDROXYZINE PAMOATE 25 MG/1
CAPSULE ORAL
COMMUNITY
End: 2022-04-28 | Stop reason: SDUPTHER

## 2022-04-28 RX ORDER — ESOMEPRAZOLE MAGNESIUM 40 MG/1
CAPSULE, DELAYED RELEASE ORAL EVERY 24 HOURS
COMMUNITY
End: 2022-04-28 | Stop reason: SDUPTHER

## 2022-04-28 RX ORDER — DICLOFENAC SODIUM 75 MG/1
TABLET, DELAYED RELEASE ORAL EVERY 12 HOURS SCHEDULED
COMMUNITY
End: 2022-05-06 | Stop reason: SDUPTHER

## 2022-04-30 LAB
BACTERIA UR CULT: NO GROWTH
BACTERIA UR CULT: NORMAL

## 2022-05-02 DIAGNOSIS — N95.1 MENOPAUSAL STATE: ICD-10-CM

## 2022-05-06 ENCOUNTER — OFFICE VISIT (OUTPATIENT)
Dept: INTERNAL MEDICINE | Facility: CLINIC | Age: 67
End: 2022-05-06

## 2022-05-06 VITALS
TEMPERATURE: 98 F | BODY MASS INDEX: 26.74 KG/M2 | HEART RATE: 81 BPM | RESPIRATION RATE: 16 BRPM | SYSTOLIC BLOOD PRESSURE: 128 MMHG | WEIGHT: 150.9 LBS | OXYGEN SATURATION: 98 % | DIASTOLIC BLOOD PRESSURE: 82 MMHG | HEIGHT: 63 IN

## 2022-05-06 DIAGNOSIS — M79.671 BILATERAL FOOT PAIN: ICD-10-CM

## 2022-05-06 DIAGNOSIS — F51.01 PRIMARY INSOMNIA: ICD-10-CM

## 2022-05-06 DIAGNOSIS — J30.89 ENVIRONMENTAL AND SEASONAL ALLERGIES: ICD-10-CM

## 2022-05-06 DIAGNOSIS — M79.672 BILATERAL FOOT PAIN: ICD-10-CM

## 2022-05-06 DIAGNOSIS — E66.3 OVERWEIGHT (BMI 25.0-29.9): ICD-10-CM

## 2022-05-06 DIAGNOSIS — K21.9 GASTROESOPHAGEAL REFLUX DISEASE WITHOUT ESOPHAGITIS: Primary | ICD-10-CM

## 2022-05-06 PROCEDURE — 99204 OFFICE O/P NEW MOD 45 MIN: CPT | Performed by: INTERNAL MEDICINE

## 2022-05-06 RX ORDER — DICLOFENAC SODIUM 75 MG/1
75 TABLET, DELAYED RELEASE ORAL 2 TIMES DAILY
Qty: 90 TABLET | Refills: 1 | Status: SHIPPED | OUTPATIENT
Start: 2022-05-06 | End: 2022-09-09

## 2022-05-06 RX ORDER — CETIRIZINE HYDROCHLORIDE 10 MG/1
10 TABLET ORAL DAILY
Qty: 90 TABLET | Refills: 1 | Status: SHIPPED | OUTPATIENT
Start: 2022-05-06 | End: 2022-11-14 | Stop reason: SDUPTHER

## 2022-05-06 RX ORDER — HYDROXYZINE PAMOATE 25 MG/1
25 CAPSULE ORAL 2 TIMES DAILY
Qty: 90 CAPSULE | Refills: 1 | Status: SHIPPED | OUTPATIENT
Start: 2022-05-06 | End: 2022-09-09

## 2022-05-06 RX ORDER — ESOMEPRAZOLE MAGNESIUM 40 MG/1
40 CAPSULE, DELAYED RELEASE ORAL
Qty: 90 CAPSULE | Refills: 3 | Status: SHIPPED | OUTPATIENT
Start: 2022-05-06 | End: 2023-03-15 | Stop reason: SDUPTHER

## 2022-05-06 NOTE — PROGRESS NOTES
CC: establish care for GERD    History:  Kathe Brito is a 66 y.o. female who presents today for evaluation of the above problems.  She presents today with her  and notes she has been doing reasonably well.  She takes Nexium daily and has symptoms of GERD if she does not take it.  She takes diclofenac to assist with foot pain, but has been able to tolerate this well and is very active with work outdoors at their home.      ROS:  Review of Systems   Constitutional: Negative for chills and fever.   Respiratory: Negative for cough and shortness of breath.    Cardiovascular: Negative for chest pain and palpitations.   Gastrointestinal: Negative for abdominal pain and constipation.   Genitourinary: Negative for difficulty urinating and dysuria.       Allergies   Allergen Reactions   • Fish-Derived Products Hives   • Iodine Rash   • Sunflower Oil Hives   • Atorvastatin Myalgia   • Sulfa Antibiotics Hives   • Silicone Rash and Unknown - High Severity     Past Medical History:   Diagnosis Date   • Acid reflux    • aortic defect    • Arthritis    • Seasonal allergies      Past Surgical History:   Procedure Laterality Date   •  SECTION     • TONSILLECTOMY       Family History   Problem Relation Age of Onset   • Osteoporosis Mother    • Stroke Mother    • Dementia Mother    • Heart disease Father    • Stroke Maternal Grandmother    • Heart disease Maternal Grandfather    • Stroke Paternal Grandmother    • Breast cancer Neg Hx    • Ovarian cancer Neg Hx    • Uterine cancer Neg Hx    • Colon cancer Neg Hx    • Melanoma Neg Hx       reports that she has never smoked. She has never used smokeless tobacco. She reports current alcohol use. She reports that she does not use drugs.      Current Outpatient Medications:   •  cetirizine (zyrTEC) 10 MG tablet, Take 1 tablet by mouth Daily., Disp: 90 tablet, Rfl: 1  •  diclofenac (VOLTAREN) 75 MG EC tablet, Take 1 tablet by mouth 2 (Two) Times a Day., Disp: 90 tablet, Rfl:  "1  •  esomeprazole (nexIUM) 40 MG capsule, Take 1 capsule by mouth Every Morning Before Breakfast., Disp: 90 capsule, Rfl: 3  •  hydrOXYzine pamoate (VISTARIL) 25 MG capsule, Take 1 capsule by mouth 2 (Two) Times a Day., Disp: 90 capsule, Rfl: 1  •  neomycin-polymyxin-hydrocortisone (CORTISPORIN) 1 % solution otic solution, Every 8 (Eight) Hours., Disp: , Rfl:   •  NON FORMULARY, DHEA 10mg daily (Patient taking differently: 1 application. DHEA 10mg daily), Disp: 30 mg, Rfl: 11  •  NON FORMULARY, Progesterone 20mg/ml  Apply 1 ml to skin once daily, Disp: 30 g, Rfl: 11  •  NON FORMULARY, 80:20 Biest 0.5mg/ml Apply 1ml to skin once daily, Disp: 30 g, Rfl: 11  •  NON FORMULARY, Estriol Vaginal Cream 3mg/ml Insert 1ml intravaginally QHS for 7 nights, then twice weekly., Disp: 30 g, Rfl: 11  •  NON FORMULARY, Testosterone 2mg/ml cream Apply 1ml to external vaginal area once daily, Disp: 30 g, Rfl: 11    OBJECTIVE:  /82 (BP Location: Left arm, Patient Position: Sitting, Cuff Size: Adult)   Pulse 81   Temp 98 °F (36.7 °C)   Resp 16   Ht 160 cm (63\")   Wt 68.4 kg (150 lb 14.4 oz)   SpO2 98%   Breastfeeding No   BMI 26.73 kg/m²    Physical Exam  Constitutional:       General: She is not in acute distress.  Cardiovascular:      Rate and Rhythm: Normal rate and regular rhythm.      Heart sounds: Normal heart sounds. No murmur heard.  Pulmonary:      Effort: Pulmonary effort is normal.      Breath sounds: Normal breath sounds. No wheezing.   Neurological:      Mental Status: She is alert and oriented to person, place, and time.      Gait: Gait normal.   Psychiatric:         Mood and Affect: Mood normal.         Behavior: Behavior normal.         Assessment/Plan     Diagnoses and all orders for this visit:    1. Gastroesophageal reflux disease without esophagitis (Primary)  -     esomeprazole (nexIUM) 40 MG capsule; Take 1 capsule by mouth Every Morning Before Breakfast.  Dispense: 90 capsule; Refill: 3  Stable " without exacerbation on PPI.    2. Bilateral foot pain  -     diclofenac (VOLTAREN) 75 MG EC tablet; Take 1 tablet by mouth 2 (Two) Times a Day.  Dispense: 90 tablet; Refill: 1    3. Overweight (BMI 25.0-29.9)  BMI is >= 25 and < 30. (Overweight) The following options were offered after discussion: exercise counseling/recommendations and nutrition counseling/recommendations    4. Primary insomnia  -     hydrOXYzine pamoate (VISTARIL) 25 MG capsule; Take 1 capsule by mouth 2 (Two) Times a Day.  Dispense: 90 capsule; Refill: 1    5. Environmental and seasonal allergies  -     cetirizine (zyrTEC) 10 MG tablet; Take 1 tablet by mouth Daily.  Dispense: 90 tablet; Refill: 1         An After Visit Summary was printed and given to the patient at discharge.  Return in about 6 months (around 11/6/2022) for Medicare Wellness.         Williams Curiel D.O. 5/6/2022   Electronically signed.

## 2022-05-11 DIAGNOSIS — Z00.01 ANNUAL VISIT FOR GENERAL ADULT MEDICAL EXAMINATION WITH ABNORMAL FINDINGS: ICD-10-CM

## 2022-05-11 DIAGNOSIS — R73.02 IMPAIRED GLUCOSE TOLERANCE: Primary | ICD-10-CM

## 2022-05-11 RX ORDER — NEOMYCIN SULFATE, POLYMYXIN B SULFATE, HYDROCORTISONE 3.5; 10000; 1 MG/ML; [USP'U]/ML; MG/ML
4 SOLUTION/ DROPS AURICULAR (OTIC) EVERY 8 HOURS SCHEDULED
Qty: 10 ML | Refills: 1 | Status: SHIPPED | OUTPATIENT
Start: 2022-05-11 | End: 2022-05-12 | Stop reason: SDUPTHER

## 2022-05-12 RX ORDER — NEOMYCIN SULFATE, POLYMYXIN B SULFATE, HYDROCORTISONE 3.5; 10000; 1 MG/ML; [USP'U]/ML; MG/ML
4 SOLUTION/ DROPS AURICULAR (OTIC) EVERY 8 HOURS SCHEDULED
Qty: 10 ML | Refills: 1 | Status: SHIPPED | OUTPATIENT
Start: 2022-05-12 | End: 2022-09-15 | Stop reason: SDUPTHER

## 2022-05-18 ENCOUNTER — LAB (OUTPATIENT)
Dept: LAB | Facility: HOSPITAL | Age: 67
End: 2022-05-18

## 2022-05-18 DIAGNOSIS — R73.02 IMPAIRED GLUCOSE TOLERANCE: ICD-10-CM

## 2022-05-18 DIAGNOSIS — Z00.01 ANNUAL VISIT FOR GENERAL ADULT MEDICAL EXAMINATION WITH ABNORMAL FINDINGS: ICD-10-CM

## 2022-05-18 LAB
ALBUMIN SERPL-MCNC: 4.6 G/DL (ref 3.5–5.2)
ALBUMIN/GLOB SERPL: 1.9 G/DL
ALP SERPL-CCNC: 71 U/L (ref 39–117)
ALT SERPL W P-5'-P-CCNC: 20 U/L (ref 1–33)
ANION GAP SERPL CALCULATED.3IONS-SCNC: 11 MMOL/L (ref 5–15)
AST SERPL-CCNC: 19 U/L (ref 1–32)
BILIRUB SERPL-MCNC: 0.4 MG/DL (ref 0–1.2)
BUN SERPL-MCNC: 22 MG/DL (ref 8–23)
BUN/CREAT SERPL: 28.6 (ref 7–25)
CALCIUM SPEC-SCNC: 8.9 MG/DL (ref 8.6–10.5)
CHLORIDE SERPL-SCNC: 104 MMOL/L (ref 98–107)
CHOLEST SERPL-MCNC: 223 MG/DL (ref 0–200)
CO2 SERPL-SCNC: 28 MMOL/L (ref 22–29)
CREAT SERPL-MCNC: 0.77 MG/DL (ref 0.57–1)
DEPRECATED RDW RBC AUTO: 48.7 FL (ref 37–54)
EGFRCR SERPLBLD CKD-EPI 2021: 85.7 ML/MIN/1.73
ERYTHROCYTE [DISTWIDTH] IN BLOOD BY AUTOMATED COUNT: 13.6 % (ref 12.3–15.4)
GLOBULIN UR ELPH-MCNC: 2.4 GM/DL
GLUCOSE SERPL-MCNC: 76 MG/DL (ref 65–99)
HBA1C MFR BLD: 5.4 % (ref 4.8–5.6)
HCT VFR BLD AUTO: 44.1 % (ref 34–46.6)
HCV AB SER DONR QL: NORMAL
HDLC SERPL-MCNC: 51 MG/DL (ref 40–60)
HGB BLD-MCNC: 13.9 G/DL (ref 12–15.9)
LDLC SERPL CALC-MCNC: 148 MG/DL (ref 0–100)
LDLC/HDLC SERPL: 2.85 {RATIO}
MCH RBC QN AUTO: 30.5 PG (ref 26.6–33)
MCHC RBC AUTO-ENTMCNC: 31.5 G/DL (ref 31.5–35.7)
MCV RBC AUTO: 96.7 FL (ref 79–97)
PLATELET # BLD AUTO: 352 10*3/MM3 (ref 140–450)
PMV BLD AUTO: 9.3 FL (ref 6–12)
POTASSIUM SERPL-SCNC: 4 MMOL/L (ref 3.5–5.2)
PROT SERPL-MCNC: 7 G/DL (ref 6–8.5)
RBC # BLD AUTO: 4.56 10*6/MM3 (ref 3.77–5.28)
SODIUM SERPL-SCNC: 143 MMOL/L (ref 136–145)
TRIGL SERPL-MCNC: 133 MG/DL (ref 0–150)
VLDLC SERPL-MCNC: 24 MG/DL (ref 5–40)
WBC NRBC COR # BLD: 13.63 10*3/MM3 (ref 3.4–10.8)

## 2022-05-18 PROCEDURE — 83036 HEMOGLOBIN GLYCOSYLATED A1C: CPT

## 2022-05-18 PROCEDURE — 86803 HEPATITIS C AB TEST: CPT

## 2022-05-18 PROCEDURE — 85027 COMPLETE CBC AUTOMATED: CPT

## 2022-05-18 PROCEDURE — 80061 LIPID PANEL: CPT

## 2022-05-18 PROCEDURE — 80053 COMPREHEN METABOLIC PANEL: CPT

## 2022-05-18 PROCEDURE — 36415 COLL VENOUS BLD VENIPUNCTURE: CPT

## 2022-05-22 NOTE — PROGRESS NOTES
Kendall Brito is a 66 y.o. female  YOB: 1955        Chief Complaint   Patient presents with   • Gynecologic Exam     Patient is here for annual exam her last pap smear was performed on 03/31/21 and was normal last mammogram was performed on 01/14/22 and was normal. Patient has no complaints at this time.        Gynecologic Exam  The patient's pertinent negatives include no pelvic pain. Associated symptoms include dysuria. Pertinent negatives include no abdominal pain, back pain, constipation, diarrhea, fever, frequency, hematuria, nausea, rash, sore throat, urgency or vomiting.       The following portions of the patient's history were reviewed and updated as appropriate: allergies, current medications, past family history, past medical history, past social history, past surgical history, and problem list.    Allergies   Allergen Reactions   • Fish-Derived Products Hives   • Iodine Rash   • Sunflower Oil Hives   • Atorvastatin Myalgia   • Sulfa Antibiotics Hives   • Silicone Rash and Unknown - High Severity       Past Medical History:   Diagnosis Date   • Acid reflux    • aortic defect    • Arthritis    • Seasonal allergies        Family History   Problem Relation Age of Onset   • Osteoporosis Mother    • Stroke Mother    • Dementia Mother    • Heart disease Father    • Stroke Maternal Grandmother    • Heart disease Maternal Grandfather    • Stroke Paternal Grandmother    • Breast cancer Neg Hx    • Ovarian cancer Neg Hx    • Uterine cancer Neg Hx    • Colon cancer Neg Hx    • Melanoma Neg Hx        Social History     Socioeconomic History   • Marital status:    Tobacco Use   • Smoking status: Never Smoker   • Smokeless tobacco: Never Used   Vaping Use   • Vaping Use: Never used   Substance and Sexual Activity   • Alcohol use: Yes     Comment: occasionally    • Drug use: Never   • Sexual activity: Yes     Partners: Male         Current Outpatient Medications:   •  cetirizine  (zyrTEC) 10 MG tablet, Take 1 tablet by mouth Daily., Disp: 90 tablet, Rfl: 1  •  diclofenac (VOLTAREN) 75 MG EC tablet, Take 1 tablet by mouth 2 (Two) Times a Day., Disp: 90 tablet, Rfl: 1  •  esomeprazole (nexIUM) 40 MG capsule, Take 1 capsule by mouth Every Morning Before Breakfast., Disp: 90 capsule, Rfl: 3  •  hydrOXYzine pamoate (VISTARIL) 25 MG capsule, Take 1 capsule by mouth 2 (Two) Times a Day., Disp: 90 capsule, Rfl: 1  •  neomycin-polymyxin-hydrocortisone (CORTISPORIN) 1 % solution otic solution, Administer 4 drops into both ears Every 8 (Eight) Hours., Disp: 10 mL, Rfl: 1  •  NON FORMULARY, DHEA 10mg daily (Patient taking differently: 1 application. DHEA 10mg daily), Disp: 30 mg, Rfl: 11  •  NON FORMULARY, Progesterone 20mg/ml  Apply 1 ml to skin once daily, Disp: 30 g, Rfl: 11  •  NON FORMULARY, 80:20 Biest 0.5mg/ml Apply 1ml to skin once daily, Disp: 30 g, Rfl: 11  •  NON FORMULARY, Estriol Vaginal Cream 3mg/ml Insert 1ml intravaginally QHS for 7 nights, then twice weekly., Disp: 30 g, Rfl: 11  •  NON FORMULARY, Testosterone 2mg/ml cream Apply 1ml to external vaginal area once daily, Disp: 30 g, Rfl: 11    No LMP recorded. Patient is postmenopausal.    Sexual History:           Could not be calculated    Past Surgical History:   Procedure Laterality Date   •  SECTION     • TONSILLECTOMY         Review of Systems   Constitutional: Negative for activity change, appetite change, fatigue, fever, unexpected weight gain and unexpected weight loss.   HENT: Negative for congestion, ear pain, hearing loss, nosebleeds, rhinorrhea, sore throat, tinnitus and trouble swallowing.    Eyes: Negative for blurred vision, pain, discharge, itching and visual disturbance.   Respiratory: Negative for apnea, chest tightness, shortness of breath and wheezing.    Cardiovascular: Negative for chest pain and leg swelling.   Gastrointestinal: Negative for abdominal pain, blood in stool, constipation, diarrhea, nausea,  vomiting and GERD.   Endocrine: Negative for heat intolerance, polydipsia and polyuria.   Genitourinary: Positive for dysuria. Negative for breast lump, decreased libido, difficulty urinating, dyspareunia, frequency, genital sores, hematuria, menstrual problem, pelvic pain, urgency, urinary incontinence and vaginal pain.   Musculoskeletal: Negative for arthralgias, back pain, joint swelling and myalgias.   Skin: Negative for color change, rash and skin lesions.   Allergic/Immunologic: Negative for environmental allergies, food allergies and immunocompromised state.   Neurological: Negative for dizziness, tremors, seizures, syncope, facial asymmetry, numbness and headache.   Hematological: Negative for adenopathy. Does not bruise/bleed easily.   Psychiatric/Behavioral: Negative for agitation, hallucinations, sleep disturbance, suicidal ideas and depressed mood. The patient is not nervous/anxious.        Objective   Physical Exam  Vitals reviewed.   Constitutional:       General: She is not in acute distress.     Appearance: She is well-developed. She is not ill-appearing.   HENT:      Head: Normocephalic.      Right Ear: External ear normal.      Left Ear: External ear normal.      Nose: Nose normal.      Mouth/Throat:      Pharynx: No oropharyngeal exudate.   Eyes:      General: No scleral icterus.        Right eye: No discharge.         Left eye: No discharge.      Conjunctiva/sclera: Conjunctivae normal.      Pupils: Pupils are equal, round, and reactive to light.   Neck:      Thyroid: No thyroid mass or thyromegaly.   Cardiovascular:      Rate and Rhythm: Normal rate and regular rhythm.      Heart sounds: Normal heart sounds. No murmur heard.  Pulmonary:      Effort: Pulmonary effort is normal. No respiratory distress.      Breath sounds: Normal breath sounds. No wheezing or rales.   Chest:      Chest wall: No tenderness.   Abdominal:      General: Bowel sounds are normal. There is no distension.      Palpations:  "Abdomen is soft. There is no mass.      Tenderness: There is no abdominal tenderness. There is no guarding or rebound.      Hernia: No hernia is present.   Genitourinary:     Exam position: Prone.      Labia:         Right: No rash, tenderness or lesion.         Left: No rash, tenderness, lesion or injury.       Vagina: Normal. No vaginal discharge or tenderness.      Cervix: No cervical motion tenderness, discharge or friability.      Uterus: Not enlarged and not tender.       Adnexa:         Right: No mass or tenderness.          Left: No mass or tenderness.        Comments: Urethra and urethral meatus normal.    Bladder - normal, no prolapse.  Perineum and rectum examined - intact and no lesions.    Musculoskeletal:         General: No tenderness or deformity. Normal range of motion.      Cervical back: Normal range of motion and neck supple.   Lymphadenopathy:      Cervical: No cervical adenopathy.   Skin:     General: Skin is warm and dry.      Coloration: Skin is not pale.      Findings: No erythema or rash.   Neurological:      Mental Status: She is alert and oriented to person, place, and time.      Motor: No abnormal muscle tone.      Coordination: Coordination normal.      Deep Tendon Reflexes: Reflexes are normal and symmetric.   Psychiatric:         Behavior: Behavior normal. Behavior is cooperative.         Thought Content: Thought content normal.         Judgment: Judgment normal.           Vitals:    04/28/22 1048   BP: 120/70   Weight: 68 kg (150 lb)   Height: 160 cm (63\")       Diagnoses and all orders for this visit:    1. Well woman exam with routine gynecological exam (Primary)  Comments:  Normal well woman exam.  Bone density ordered.  Mammogram ordered.    2. Encounter for screening mammogram for malignant neoplasm of breast  Comments:  Mammogram ordered.  Orders:  -     Mammo screening digital tomosynthesis bilateral w CAD; Future    3. Dysuria  Comments:  Patient reports dysuria.  Urine " culture [id number] pending results.  Orders:  -     Urine Culture - Urine, Urine, Clean Catch    4. Menopausal state  Comments:  Bone density [id number] pending results.  Orders:  -     dexa bone density axial; Future        Normal GYN exam. Will have lab work here. Encouraged SBE.  Pt is aware how to do self breast exam and the importance of same. Discussed weight management and importance of maintaining a healthy weight. Discussed Vitamin D intake and the importance of adequate vitamin D for both bone health and a healthy immune system.  Discussed daily exercise and the importance of same in regards to a healthy heart as well as helping to maintain her weight and improving her mental health.  Body mass index is 26.57 kg/m². Colonoscopy is up to date.  Mammogram will be scheduled at [facility]. Pap smear is done per [facility] guidelines.    BMI is >= 25 and < 30. (Overweight) The following options were offered after discussion: exercise counseling/recommendations and nutrition counseling/recommendations             Non-Smoker    MyChart Instructions Given

## 2022-06-01 ENCOUNTER — TELEPHONE (OUTPATIENT)
Dept: OBSTETRICS AND GYNECOLOGY | Facility: CLINIC | Age: 67
End: 2022-06-01

## 2022-06-01 NOTE — TELEPHONE ENCOUNTER
Pt requesting refills on all hormone replacement therapy.  Pt last seen 04/28/22.  Last refills sent 3/30/22.  Pt has appt on 7/26/22.  Pt requesting all hormones be refilled at  pharmacy.

## 2022-06-06 DIAGNOSIS — N95.1 MENOPAUSAL STATE: ICD-10-CM

## 2022-07-05 ENCOUNTER — TELEPHONE (OUTPATIENT)
Dept: OBSTETRICS AND GYNECOLOGY | Facility: CLINIC | Age: 67
End: 2022-07-05

## 2022-07-05 NOTE — TELEPHONE ENCOUNTER
Pt left a VM asking about hormone refills.  It looks like they were called into pharmacy.  Do you know anything about this?

## 2022-07-26 ENCOUNTER — TELEMEDICINE (OUTPATIENT)
Dept: OBSTETRICS AND GYNECOLOGY | Facility: CLINIC | Age: 67
End: 2022-07-26

## 2022-07-26 DIAGNOSIS — N95.1 MENOPAUSAL STATE: ICD-10-CM

## 2022-07-26 PROCEDURE — 99213 OFFICE O/P EST LOW 20 MIN: CPT | Performed by: NURSE PRACTITIONER

## 2022-07-26 NOTE — PROGRESS NOTES
Patient seen via video visit    Subjective   Kathe Brito is a 66 y.o. female  YOB: 1955      No chief complaint on file.      Patient here for f/u after staring compounded hormones r/t menopause.       The following portions of the patient's history were reviewed and updated as appropriate: allergies, current medications, past family history, past medical history, past social history, past surgical history, and problem list.    Allergies   Allergen Reactions   • Fish-Derived Products Hives   • Iodine Rash   • Sunflower Oil Hives   • Atorvastatin Myalgia   • Sulfa Antibiotics Hives   • Silicone Rash and Unknown - High Severity       Past Medical History:   Diagnosis Date   • Acid reflux    • aortic defect    • Arthritis    • Seasonal allergies        Family History   Problem Relation Age of Onset   • Osteoporosis Mother    • Stroke Mother    • Dementia Mother    • Heart disease Father    • Stroke Maternal Grandmother    • Heart disease Maternal Grandfather    • Stroke Paternal Grandmother    • Breast cancer Neg Hx    • Ovarian cancer Neg Hx    • Uterine cancer Neg Hx    • Colon cancer Neg Hx    • Melanoma Neg Hx        Social History     Socioeconomic History   • Marital status:    Tobacco Use   • Smoking status: Never Smoker   • Smokeless tobacco: Never Used   Vaping Use   • Vaping Use: Never used   Substance and Sexual Activity   • Alcohol use: Yes     Comment: occasionally    • Drug use: Never   • Sexual activity: Yes     Partners: Male         Current Outpatient Medications:   •  NON FORMULARY, Apply 1 application topically to the appropriate area as directed Daily. DHEA 10mg daily, Disp: 1 container, Rfl: 2  •  NON FORMULARY, Progesterone 20mg/ml  Apply 1 ml to skin once daily, Disp: 30 g, Rfl: 2  •  NON FORMULARY, 80:20 Biest 0.5mg/ml Apply 1ml to skin once daily, Disp: 30 g, Rfl: 2  •  NON FORMULARY, Estriol Vaginal Cream 3mg/ml Insert 1ml intravaginally QHS for 7 nights, then twice  weekly., Disp: 30 g, Rfl: 2  •  NON FORMULARY, Testosterone 2mg/ml cream Apply 1ml to external vaginal area once daily, Disp: 30 g, Rfl: 2  •  cetirizine (zyrTEC) 10 MG tablet, Take 1 tablet by mouth Daily., Disp: 90 tablet, Rfl: 1  •  diclofenac (VOLTAREN) 75 MG EC tablet, Take 1 tablet by mouth 2 (Two) Times a Day., Disp: 90 tablet, Rfl: 1  •  esomeprazole (nexIUM) 40 MG capsule, Take 1 capsule by mouth Every Morning Before Breakfast., Disp: 90 capsule, Rfl: 3  •  hydrOXYzine pamoate (VISTARIL) 25 MG capsule, Take 1 capsule by mouth 2 (Two) Times a Day., Disp: 90 capsule, Rfl: 1  •  neomycin-polymyxin-hydrocortisone (CORTISPORIN) 1 % solution otic solution, Administer 4 drops into both ears Every 8 (Eight) Hours., Disp: 10 mL, Rfl: 1    No LMP recorded. Patient is postmenopausal.    Sexual History:         Could not be calculated    Past Surgical History:   Procedure Laterality Date   •  SECTION     • TONSILLECTOMY         Review of Systems   Constitutional: Negative for activity change, appetite change, chills, diaphoresis, fatigue, fever and unexpected weight change.   HENT: Negative for congestion, dental problem, drooling, ear discharge, ear pain, facial swelling, hearing loss, mouth sores, nosebleeds, postnasal drip, rhinorrhea, sinus pressure, sinus pain, sneezing, sore throat, tinnitus, trouble swallowing and voice change.    Eyes: Negative for photophobia, pain, discharge, redness, itching and visual disturbance.   Respiratory: Negative for apnea, cough, choking, chest tightness, shortness of breath, wheezing and stridor.    Cardiovascular: Negative for chest pain, palpitations and leg swelling.   Gastrointestinal: Negative for abdominal distention, abdominal pain, anal bleeding, blood in stool, constipation, diarrhea, nausea, rectal pain and vomiting.   Endocrine: Negative for cold intolerance, heat intolerance, polydipsia, polyphagia and polyuria.   Genitourinary: Negative for decreased urine  volume, difficulty urinating, dyspareunia, dysuria, enuresis, flank pain, frequency, genital sores, hematuria, menstrual problem, pelvic pain, urgency, vaginal bleeding, vaginal discharge and vaginal pain.   Musculoskeletal: Negative for arthralgias, back pain, gait problem, joint swelling, myalgias, neck pain and neck stiffness.   Skin: Negative for color change, pallor, rash and wound.   Allergic/Immunologic: Negative for environmental allergies, food allergies and immunocompromised state.   Neurological: Negative for dizziness, tremors, seizures, syncope, facial asymmetry, speech difficulty, weakness, light-headedness, numbness and headaches.   Hematological: Negative for adenopathy. Does not bruise/bleed easily.   Psychiatric/Behavioral: Negative for agitation, behavioral problems, confusion, decreased concentration, dysphoric mood, hallucinations, self-injury, sleep disturbance and suicidal ideas. The patient is not nervous/anxious and is not hyperactive.        Objective   Physical Exam  Vitals reviewed: No physical exam.           There were no vitals filed for this visit.    Diagnoses and all orders for this visit:    1. Menopausal state  Comments:  Patient doing well on comounded hormones and wants to remain on them.  Jaime reviewed.  Refill sent to pharmacy.  RTO in 3 months for f/u or sooner prn.   Orders:  -     NON FORMULARY; Apply 1 application topically to the appropriate area as directed Daily. DHEA 10mg daily  Dispense: 1 container; Refill: 2  -     NON FORMULARY; Progesterone 20mg/ml   Apply 1 ml to skin once daily  Dispense: 30 g; Refill: 2  -     NON FORMULARY; 80:20 Biest 0.5mg/ml  Apply 1ml to skin once daily  Dispense: 30 g; Refill: 2  -     NON FORMULARY; Estriol Vaginal Cream 3mg/ml  Insert 1ml intravaginally QHS for 7 nights, then twice weekly.  Dispense: 30 g; Refill: 2  -     NON FORMULARY; Testosterone 2mg/ml cream  Apply 1ml to external vaginal area once daily  Dispense: 30 g; Refill:  2          BMI is >= 25 and <30. (Overweight) The following options were offered after discussion;: exercise counseling/recommendations and nutrition counseling/recommendations             Non-Smoker    MyChart Instructions Given

## 2022-09-09 DIAGNOSIS — M79.671 BILATERAL FOOT PAIN: ICD-10-CM

## 2022-09-09 DIAGNOSIS — F51.01 PRIMARY INSOMNIA: ICD-10-CM

## 2022-09-09 DIAGNOSIS — M79.672 BILATERAL FOOT PAIN: ICD-10-CM

## 2022-09-09 RX ORDER — HYDROXYZINE PAMOATE 25 MG/1
25 CAPSULE ORAL 2 TIMES DAILY
Qty: 90 CAPSULE | Refills: 7 | Status: SHIPPED | OUTPATIENT
Start: 2022-09-09

## 2022-09-09 RX ORDER — NEOMYCIN SULFATE, POLYMYXIN B SULFATE, HYDROCORTISONE 3.5; 10000; 1 MG/ML; [USP'U]/ML; MG/ML
4 SOLUTION/ DROPS AURICULAR (OTIC) EVERY 8 HOURS
Qty: 10 ML | Refills: 72 | OUTPATIENT
Start: 2022-09-09

## 2022-09-09 RX ORDER — DICLOFENAC SODIUM 75 MG/1
75 TABLET, DELAYED RELEASE ORAL 2 TIMES DAILY
Qty: 90 TABLET | Refills: 7 | Status: SHIPPED | OUTPATIENT
Start: 2022-09-09

## 2022-09-12 NOTE — TELEPHONE ENCOUNTER
How often does she use it? This could be concerning if she is using on any kind of consistent basis. What is her trigger to go ahead and use it?

## 2022-09-12 NOTE — TELEPHONE ENCOUNTER
PATIENT HAS BEEN CALLED, SHE STATED THAT SHE USES CORTISPRORIN DROPS FOR HER EARS.  SHE STATED THAT ITS THE ONLY THING THAT HAS HELPED.  SHE HAS USED IT FOR AT LEAST 10 YEARS.   SHE DOESN'T USE IT EVERDAY BUT DOES NEED IT.

## 2022-09-15 RX ORDER — NEOMYCIN SULFATE, POLYMYXIN B SULFATE, HYDROCORTISONE 3.5; 10000; 1 MG/ML; [USP'U]/ML; MG/ML
4 SOLUTION/ DROPS AURICULAR (OTIC) EVERY 8 HOURS SCHEDULED
Qty: 10 ML | Refills: 6 | Status: SHIPPED | OUTPATIENT
Start: 2022-09-15

## 2022-10-26 ENCOUNTER — LAB (OUTPATIENT)
Dept: LAB | Facility: HOSPITAL | Age: 67
End: 2022-10-26

## 2022-10-26 ENCOUNTER — TELEPHONE (OUTPATIENT)
Dept: OBSTETRICS AND GYNECOLOGY | Facility: CLINIC | Age: 67
End: 2022-10-26

## 2022-10-26 ENCOUNTER — TELEMEDICINE (OUTPATIENT)
Dept: OBSTETRICS AND GYNECOLOGY | Facility: CLINIC | Age: 67
End: 2022-10-26

## 2022-10-26 DIAGNOSIS — R30.0 DYSURIA: ICD-10-CM

## 2022-10-26 DIAGNOSIS — N95.1 MENOPAUSAL STATE: Primary | ICD-10-CM

## 2022-10-26 DIAGNOSIS — R30.0 DYSURIA: Primary | ICD-10-CM

## 2022-10-26 LAB
BILIRUB UR QL STRIP: NEGATIVE
CLARITY UR: CLEAR
COLOR UR: YELLOW
GLUCOSE UR STRIP-MCNC: NEGATIVE MG/DL
HGB UR QL STRIP.AUTO: NEGATIVE
KETONES UR QL STRIP: NEGATIVE
LEUKOCYTE ESTERASE UR QL STRIP.AUTO: NEGATIVE
NITRITE UR QL STRIP: NEGATIVE
PH UR STRIP.AUTO: 6.5 [PH] (ref 5–8)
PROT UR QL STRIP: NEGATIVE
SP GR UR STRIP: 1.01 (ref 1–1.03)
UROBILINOGEN UR QL STRIP: NORMAL

## 2022-10-26 PROCEDURE — 81003 URINALYSIS AUTO W/O SCOPE: CPT

## 2022-10-26 PROCEDURE — 87086 URINE CULTURE/COLONY COUNT: CPT

## 2022-10-26 PROCEDURE — 99213 OFFICE O/P EST LOW 20 MIN: CPT | Performed by: NURSE PRACTITIONER

## 2022-10-26 NOTE — PROGRESS NOTES
Subjective   Kathe Birto is a 66 y.o. female  YOB: 1955      No chief complaint on file.      Patient here for refill on compounded hormones      The following portions of the patient's history were reviewed and updated as appropriate: allergies, current medications, past family history, past medical history, past social history, past surgical history, and problem list.    Allergies   Allergen Reactions   • Fish-Derived Products Hives   • Iodine Rash   • Sunflower Oil Hives   • Atorvastatin Myalgia   • Sulfa Antibiotics Hives   • Silicone Rash and Unknown - High Severity       Past Medical History:   Diagnosis Date   • Acid reflux    • aortic defect    • Arthritis    • Seasonal allergies        Family History   Problem Relation Age of Onset   • Osteoporosis Mother    • Stroke Mother    • Dementia Mother    • Heart disease Father    • Stroke Maternal Grandmother    • Heart disease Maternal Grandfather    • Stroke Paternal Grandmother    • Breast cancer Neg Hx    • Ovarian cancer Neg Hx    • Uterine cancer Neg Hx    • Colon cancer Neg Hx    • Melanoma Neg Hx        Social History     Socioeconomic History   • Marital status:    Tobacco Use   • Smoking status: Never   • Smokeless tobacco: Never   Vaping Use   • Vaping Use: Never used   Substance and Sexual Activity   • Alcohol use: Yes     Comment: occasionally    • Drug use: Never   • Sexual activity: Yes     Partners: Male         Current Outpatient Medications:   •  NON FORMULARY, Apply 1 application topically to the appropriate area as directed Daily. DHEA 10mg daily, Disp: 30 g, Rfl: 2  •  NON FORMULARY, Progesterone 20mg/ml  Apply 1 ml to skin once daily, Disp: 30 g, Rfl: 2  •  NON FORMULARY, 80:20 Biest 0.5mg/ml Apply 1ml to skin once daily, Disp: 30 g, Rfl: 2  •  NON FORMULARY, Estriol Vaginal Cream 3mg/ml Insert 1ml intravaginally QHS for 7 nights, then twice weekly., Disp: 30 g, Rfl: 2  •  NON FORMULARY, Testosterone 2mg/ml cream  Apply 1ml to external vaginal area once daily, Disp: 30 g, Rfl: 2  •  cetirizine (zyrTEC) 10 MG tablet, Take 1 tablet by mouth Daily., Disp: 90 tablet, Rfl: 1  •  diclofenac (VOLTAREN) 75 MG EC tablet, Take 1 tablet by mouth 2 (Two) Times a Day., Disp: 90 tablet, Rfl: 7  •  esomeprazole (nexIUM) 40 MG capsule, Take 1 capsule by mouth Every Morning Before Breakfast., Disp: 90 capsule, Rfl: 3  •  hydrOXYzine pamoate (VISTARIL) 25 MG capsule, Take 1 capsule by mouth 2 (Two) Times a Day., Disp: 90 capsule, Rfl: 7  •  neomycin-polymyxin-hydrocortisone (CORTISPORIN) 1 % solution otic solution, Administer 4 drops into both ears Every 8 (Eight) Hours., Disp: 10 mL, Rfl: 6    No LMP recorded. Patient is postmenopausal.    Sexual History:         Could not be calculated    Past Surgical History:   Procedure Laterality Date   •  SECTION     • TONSILLECTOMY         Review of Systems   Constitutional: Negative for activity change, appetite change, chills, diaphoresis, fatigue, fever and unexpected weight change.   HENT: Negative for congestion, dental problem, drooling, ear discharge, ear pain, facial swelling, hearing loss, mouth sores, nosebleeds, postnasal drip, rhinorrhea, sinus pressure, sinus pain, sneezing, sore throat, tinnitus, trouble swallowing and voice change.    Eyes: Negative for photophobia, pain, discharge, redness, itching and visual disturbance.   Respiratory: Negative for apnea, cough, choking, chest tightness, shortness of breath, wheezing and stridor.    Cardiovascular: Negative for chest pain, palpitations and leg swelling.   Gastrointestinal: Negative for abdominal distention, abdominal pain, anal bleeding, blood in stool, constipation, diarrhea, nausea, rectal pain and vomiting.   Endocrine: Negative for cold intolerance, heat intolerance, polydipsia, polyphagia and polyuria.   Genitourinary: Negative for decreased urine volume, difficulty urinating, dyspareunia, dysuria, enuresis, flank pain,  frequency, genital sores, hematuria, menstrual problem, pelvic pain, urgency, vaginal bleeding, vaginal discharge and vaginal pain.   Musculoskeletal: Negative for arthralgias, back pain, gait problem, joint swelling, myalgias, neck pain and neck stiffness.   Skin: Negative for color change, pallor, rash and wound.   Allergic/Immunologic: Negative for environmental allergies, food allergies and immunocompromised state.   Neurological: Negative for dizziness, tremors, seizures, syncope, facial asymmetry, speech difficulty, weakness, light-headedness, numbness and headaches.   Hematological: Negative for adenopathy. Does not bruise/bleed easily.   Psychiatric/Behavioral: Negative for agitation, behavioral problems, confusion, decreased concentration, dysphoric mood, hallucinations, self-injury, sleep disturbance and suicidal ideas. The patient is not nervous/anxious and is not hyperactive.        Objective   Physical Exam  Vitals reviewed: No physical exam.           There were no vitals filed for this visit.    Diagnoses and all orders for this visit:    1. Menopausal state (Primary)  Comments:  Patient doing well on comounded hormones and wants to remain on them.  Jaime reviewed.  Refill sent to pharmacy   Orders:  -     NON FORMULARY; Apply 1 application topically to the appropriate area as directed Daily. DHEA 10mg daily  Dispense: 30 g; Refill: 2  -     NON FORMULARY; Progesterone 20mg/ml   Apply 1 ml to skin once daily  Dispense: 30 g; Refill: 2  -     NON FORMULARY; 80:20 Biest 0.5mg/ml  Apply 1ml to skin once daily  Dispense: 30 g; Refill: 2  -     NON FORMULARY; Estriol Vaginal Cream 3mg/ml  Insert 1ml intravaginally QHS for 7 nights, then twice weekly.  Dispense: 30 g; Refill: 2  -     NON FORMULARY; Testosterone 2mg/ml cream  Apply 1ml to external vaginal area once daily  Dispense: 30 g; Refill: 2    2. Dysuria  Comments:  Patient reports dysuria.  Request urinalysis.  Patient to come by later today to  give urine sample.  UA and urine culture [id number] pending results.  Orders:  -     Urinalysis With Microscopic If Indicated (No Culture) - Urine, Clean Catch  -     Urine Culture - Urine, Urine, Clean Catch          BMI is >= 25 and <30. (Overweight) The following options were offered after discussion;: exercise counseling/recommendations and nutrition counseling/recommendations             Non-Smoker    MyChart Instructions Given

## 2022-10-27 LAB — BACTERIA SPEC AEROBE CULT: NO GROWTH

## 2022-11-14 ENCOUNTER — OFFICE VISIT (OUTPATIENT)
Dept: INTERNAL MEDICINE | Facility: CLINIC | Age: 67
End: 2022-11-14

## 2022-11-14 VITALS
HEART RATE: 69 BPM | OXYGEN SATURATION: 98 % | TEMPERATURE: 97.7 F | HEIGHT: 63 IN | RESPIRATION RATE: 16 BRPM | DIASTOLIC BLOOD PRESSURE: 70 MMHG | SYSTOLIC BLOOD PRESSURE: 112 MMHG | WEIGHT: 150.4 LBS | BODY MASS INDEX: 26.65 KG/M2

## 2022-11-14 DIAGNOSIS — J30.89 ENVIRONMENTAL AND SEASONAL ALLERGIES: Primary | ICD-10-CM

## 2022-11-14 DIAGNOSIS — Z23 NEED FOR VACCINATION: ICD-10-CM

## 2022-11-14 DIAGNOSIS — R73.02 IMPAIRED GLUCOSE TOLERANCE: ICD-10-CM

## 2022-11-14 PROCEDURE — 91312 COVID-19 (PFIZER) BIVALENT BOOSTER 12+YRS: CPT | Performed by: INTERNAL MEDICINE

## 2022-11-14 PROCEDURE — 1170F FXNL STATUS ASSESSED: CPT | Performed by: INTERNAL MEDICINE

## 2022-11-14 PROCEDURE — G0402 INITIAL PREVENTIVE EXAM: HCPCS | Performed by: INTERNAL MEDICINE

## 2022-11-14 PROCEDURE — 1160F RVW MEDS BY RX/DR IN RCRD: CPT | Performed by: INTERNAL MEDICINE

## 2022-11-14 PROCEDURE — 0124A PR ADM SARSCOV2 30MCG/0.3ML BST: CPT | Performed by: INTERNAL MEDICINE

## 2022-11-14 PROCEDURE — 1126F AMNT PAIN NOTED NONE PRSNT: CPT | Performed by: INTERNAL MEDICINE

## 2022-11-14 PROCEDURE — 1159F MED LIST DOCD IN RCRD: CPT | Performed by: INTERNAL MEDICINE

## 2022-11-14 RX ORDER — CETIRIZINE HYDROCHLORIDE 10 MG/1
10 TABLET ORAL DAILY
Qty: 90 TABLET | Refills: 3 | Status: SHIPPED | OUTPATIENT
Start: 2022-11-14

## 2022-11-14 NOTE — PROGRESS NOTES
"CC:    History:  Kathe Brito is a 66 y.o. female   ***    {SnapShot  Notes  Encounters  Result Review  Labs  Imaging  Meds  Media :23}   ROS:  Review of Systems     reports that she has never smoked. She has never used smokeless tobacco. She reports current alcohol use. She reports that she does not use drugs.      Current Outpatient Medications:   •  cetirizine (zyrTEC) 10 MG tablet, Take 1 tablet by mouth Daily., Disp: 90 tablet, Rfl: 3  •  diclofenac (VOLTAREN) 75 MG EC tablet, Take 1 tablet by mouth 2 (Two) Times a Day., Disp: 90 tablet, Rfl: 7  •  esomeprazole (nexIUM) 40 MG capsule, Take 1 capsule by mouth Every Morning Before Breakfast., Disp: 90 capsule, Rfl: 3  •  hydrOXYzine pamoate (VISTARIL) 25 MG capsule, Take 1 capsule by mouth 2 (Two) Times a Day., Disp: 90 capsule, Rfl: 7  •  neomycin-polymyxin-hydrocortisone (CORTISPORIN) 1 % solution otic solution, Administer 4 drops into both ears Every 8 (Eight) Hours., Disp: 10 mL, Rfl: 6  •  NON FORMULARY, Apply 1 application topically to the appropriate area as directed Daily. DHEA 10mg daily, Disp: 30 g, Rfl: 2  •  NON FORMULARY, Progesterone 20mg/ml  Apply 1 ml to skin once daily, Disp: 30 g, Rfl: 2  •  NON FORMULARY, 80:20 Biest 0.5mg/ml Apply 1ml to skin once daily, Disp: 30 g, Rfl: 2  •  NON FORMULARY, Estriol Vaginal Cream 3mg/ml Insert 1ml intravaginally QHS for 7 nights, then twice weekly., Disp: 30 g, Rfl: 2  •  NON FORMULARY, Testosterone 2mg/ml cream Apply 1ml to external vaginal area once daily, Disp: 30 g, Rfl: 2  •  Turmeric (QC TUMERIC COMPLEX PO), Take  by mouth., Disp: , Rfl:     OBJECTIVE:  /70 (BP Location: Left arm, Patient Position: Sitting, Cuff Size: Adult)   Pulse 69   Temp 97.7 °F (36.5 °C)   Resp 16   Ht 160 cm (63\")   Wt 68.2 kg (150 lb 6.4 oz)   SpO2 98%   BMI 26.64 kg/m²    Physical Exam    Assessment/Plan  {Problem List  Visit Diagnosis  Medications  SmartSets  Prep for Surgery  BestPractice :23} "   Diagnoses and all orders for this visit:    1. Impaired glucose tolerance (Primary)    2. Environmental and seasonal allergies  -     cetirizine (zyrTEC) 10 MG tablet; Take 1 tablet by mouth Daily.  Dispense: 90 tablet; Refill: 3    3. Need for vaccination  -     COVID-19 Bivalent Booster (Pfizer) 12+yrs        An After Visit Summary was printed and given to the patient at discharge.  No follow-ups on file.  {Review (Popup)  Instructions  Quality  Charge Capture  LOS  Follow-up  Communications :23}        Williams Curiel D.O. 11/14/2022   Electronically signed.

## 2022-11-14 NOTE — PATIENT INSTRUCTIONS
Medicare Wellness  Personal Prevention Plan of Service     Date of Office Visit:    Encounter Provider:  Williams Curiel DO  Place of Service:  [facility]  Patient Name: Kathe Brito  :  1955    As part of the Medicare Wellness portion of your visit today, we are providing you with this personalized preventive plan of services (PPPS). This plan is based upon recommendations of the [place] Preventive Services Task Force (USPSTF) and the Advisory Committee on Immunization Practices (ACIP).    This lists the preventive care services that should be considered, and provides dates of when you are due. Items listed as completed are up-to-date and do not require any further intervention.    Health Maintenance   Topic Date Due    DXA SCAN  Never done    COLORECTAL CANCER SCREENING  Never done    Pneumococcal Vaccine 65+ (1 - PCV) Never done    ANNUAL WELLNESS VISIT  2023    MAMMOGRAM  2024    PAP SMEAR  2024    TDAP/TD VACCINES (2 - Td or Tdap) 2027    HEPATITIS C SCREENING  Completed    COVID-19 Vaccine  Completed    INFLUENZA VACCINE  Completed    ZOSTER VACCINE  Completed       Orders Placed This Encounter   Procedures    COVID-19 Bivalent Booster (Pfizer) 12+yrs       Return in about 1 year (around 2023) for Medicare Wellness.

## 2022-11-14 NOTE — PROGRESS NOTES
The ABCs of the Annual Wellness Visit  [place] to Medicare Visit    Chief Complaint   Patient presents with   • Welcome To Medicare     Subjective {   History of Present Illness:  Kathe Brito is a 66 y.o. female who presents for a  Welcome to Medicare Visit.    The following portions of the patient's history were reviewed and   updated as appropriate: allergies, current medications, past family history, past medical history, past social history, past surgical history and problem list.     Compared to one year ago, the patient feels her physical   health is the same.    Compared to one year ago, the patient feels her mental   health is the same.    Recent Hospitalizations:  She was not admitted to the hospital during the last year.       Current Medical Providers:  Patient Care Team:  Williams Curiel DO as PCP - General (Internal Medicine)    Outpatient Medications Prior to Visit   Medication Sig Dispense Refill   • diclofenac (VOLTAREN) 75 MG EC tablet Take 1 tablet by mouth 2 (Two) Times a Day. 90 tablet 7   • esomeprazole (nexIUM) 40 MG capsule Take 1 capsule by mouth Every Morning Before Breakfast. 90 capsule 3   • hydrOXYzine pamoate (VISTARIL) 25 MG capsule Take 1 capsule by mouth 2 (Two) Times a Day. 90 capsule 7   • neomycin-polymyxin-hydrocortisone (CORTISPORIN) 1 % solution otic solution Administer 4 drops into both ears Every 8 (Eight) Hours. 10 mL 6   • NON FORMULARY Apply 1 application topically to the appropriate area as directed Daily. DHEA 10mg daily 30 g 2   • NON FORMULARY Progesterone 20mg/ml   Apply 1 ml to skin once daily 30 g 2   • NON FORMULARY 80:20 Biest 0.5mg/ml  Apply 1ml to skin once daily 30 g 2   • NON FORMULARY Estriol Vaginal Cream 3mg/ml  Insert 1ml intravaginally QHS for 7 nights, then twice weekly. 30 g 2   • NON FORMULARY Testosterone 2mg/ml cream  Apply 1ml to external vaginal area once daily 30 g 2   • Turmeric (QC TUMERIC COMPLEX PO) Take  by mouth.     • cetirizine  "(zyrTEC) 10 MG tablet Take 1 tablet by mouth Daily. 90 tablet 1     No facility-administered medications prior to visit.       No opioid medication identified on active medication list. I have reviewed chart for other potential  high risk medication/s and harmful drug interactions in the elderly.          Aspirin is not on active medication list.  Aspirin use is not indicated based on review of current medical condition/s. Risk of harm outweighs potential benefits.  .    Patient Active Problem List   Diagnosis   • Gastroesophageal reflux disease without esophagitis   • Overweight (BMI 25.0-29.9)   • Bilateral foot pain   • Impaired glucose tolerance     Advance Care Planning  Advance Directive is not on file.  ACP discussion was held with the patient during this visit. Patient does not have an advance directive, information provided.    Review of Systems   Constitutional: Negative for fever.   Respiratory: Negative for shortness of breath.    Cardiovascular: Negative for chest pain.   Gastrointestinal: Negative for abdominal pain.   Neurological: Negative for dizziness.        Objective      Vitals:    11/14/22 1006   BP: 112/70   BP Location: Left arm   Patient Position: Sitting   Cuff Size: Adult   Pulse: 69   Resp: 16   Temp: 97.7 °F (36.5 °C)   SpO2: 98%   Weight: 68.2 kg (150 lb 6.4 oz)   Height: 160 cm (63\")     Estimated body mass index is 26.64 kg/m² as calculated from the following:    Height as of this encounter: 160 cm (63\").    Weight as of this encounter: 68.2 kg (150 lb 6.4 oz).    BMI is >= 25 and <30. (Overweight) The following options were offered after discussion;: exercise counseling/recommendations and nutrition counseling/recommendations      Does the patient have evidence of cognitive impairment? No    Physical Exam  Constitutional:       General: She is not in acute distress.  Cardiovascular:      Rate and Rhythm: Normal rate and regular rhythm.      Heart sounds: Normal heart sounds. No " murmur heard.  Pulmonary:      Effort: Pulmonary effort is normal.      Breath sounds: Normal breath sounds. No wheezing.   Neurological:      Mental Status: She is alert and oriented to person, place, and time.      Gait: Gait normal.   Psychiatric:         Mood and Affect: Mood normal.         Behavior: Behavior normal.              Procedures       HEALTH RISK ASSESSMENT    Smoking Status:  Social History     Tobacco Use   Smoking Status Never   Smokeless Tobacco Never     Alcohol Consumption:  Social History     Substance and Sexual Activity   Alcohol Use Yes    Comment: occasionally        Fall Risk Screen:    [facility], and patient is at LOW risk for falls.Assessment completed on:11/14/2022    Depression Screen:   PHQ-2/PHQ-9 Depression Screening 11/14/2022   Retired PHQ-9 Total Score -   Retired Total Score -   Little Interest or Pleasure in Doing Things 0-->not at all   Feeling Down, Depressed or Hopeless 0-->not at all   PHQ-9: Brief Depression Severity Measure Score 0       Health Habits and Functional and Cognitive Screening:  Functional & Cognitive Status 11/14/2022   Do you have difficulty preparing food and eating? No   Do you have difficulty bathing yourself, getting dressed or grooming yourself? No   Do you have difficulty using the toilet? No   Do you have difficulty moving around from place to place? No   Do you have trouble with steps or getting out of a bed or a chair? No   Current Diet Low Fat Diet   Dental Exam Up to date   Eye Exam Up to date   Exercise (times per week) 6 times per week   Current Exercises Include Walking   Do you need help using the phone?  No   Are you deaf or do you have serious difficulty hearing?  No   Do you need help with transportation? No   Do you need help shopping? No   Do you need help preparing meals?  No   Do you need help with housework?  No   Do you need help with laundry? No   Do you need help taking your medications? No   Do  you need help managing money? No   Do you ever drive or ride in a car without wearing a seat belt? No   Have you felt unusual stress, anger or loneliness in the last month? No   Who do you live with? Spouse   If you need help, do you have trouble finding someone available to you? No   Have you been bothered in the last four weeks by sexual problems? No   Do you have difficulty concentrating, remembering or making decisions? No       Visual Acuity:    No results found.    Age-appropriate Screening Schedule:  Refer to the list below for future screening recommendations based on patient's age, sex and/or medical conditions. Orders for these recommended tests are listed in the plan section. The patient has been provided with a written plan.    Health Maintenance   Topic Date Due   • DXA SCAN  Never done   • MAMMOGRAM  01/14/2024   • PAP SMEAR  03/31/2024   • TDAP/TD VACCINES (2 - Td or Tdap) 01/01/2027   • INFLUENZA VACCINE  Completed   • ZOSTER VACCINE  Completed          Assessment & Plan   CMS Preventative Services Quick Reference  Risk Factors Identified During Encounter  Cardiovascular Disease  Immunizations Discussed/Encouraged (specific Immunizations; Tdap, Prevnar 20 (Pneumococcal 20-valent conjugate) and COVID19  Obesity/Overweight   The above risks/problems have been discussed with the patient.  Pertinent information has been shared with the patient in the After Visit Summary.  Follow up plans and orders are seen below in the Assessment/Plan Section.    Diagnoses and all orders for this visit:    1. Environmental and seasonal allergies (Primary)  -     cetirizine (zyrTEC) 10 MG tablet; Take 1 tablet by mouth Daily.  Dispense: 90 tablet; Refill: 3    2. Impaired glucose tolerance    3. Need for vaccination  -     COVID-19 Bivalent Booster (Pfizer) 12+yrs        Follow Up:   Return in about 1 year (around 11/14/2023) for Medicare Wellness.     An After Visit Summary and PPPS were made available to the  patient.

## 2023-01-24 ENCOUNTER — TELEMEDICINE (OUTPATIENT)
Dept: OBSTETRICS AND GYNECOLOGY | Facility: CLINIC | Age: 68
End: 2023-01-24
Payer: MEDICARE

## 2023-01-24 DIAGNOSIS — N95.1 MENOPAUSAL STATE: Primary | ICD-10-CM

## 2023-01-24 DIAGNOSIS — N95.1 VASOMOTOR SYMPTOMS DUE TO MENOPAUSE: ICD-10-CM

## 2023-01-24 PROCEDURE — 99214 OFFICE O/P EST MOD 30 MIN: CPT | Performed by: NURSE PRACTITIONER

## 2023-01-24 NOTE — PROGRESS NOTES
Patient seen via video visit.    Subjective   Kathe Brito is a 67 y.o. female  YOB: 1955      No chief complaint on file.      Patient here for refill of compounded hormones that includes testosterone      The following portions of the patient's history were reviewed and updated as appropriate: allergies, current medications, past family history, past medical history, past social history, past surgical history, and problem list.    Allergies   Allergen Reactions   • Fish-Derived Products Hives   • Iodine Rash   • Sunflower Oil Hives   • Atorvastatin Myalgia   • Sulfa Antibiotics Hives   • Silicone Rash and Unknown - High Severity       Past Medical History:   Diagnosis Date   • Acid reflux    • aortic defect    • Arthritis    • Seasonal allergies        Family History   Problem Relation Age of Onset   • Osteoporosis Mother    • Stroke Mother    • Dementia Mother    • Heart disease Father    • Stroke Maternal Grandmother    • Heart disease Maternal Grandfather    • Stroke Paternal Grandmother    • Breast cancer Neg Hx    • Ovarian cancer Neg Hx    • Uterine cancer Neg Hx    • Colon cancer Neg Hx    • Melanoma Neg Hx        Social History     Socioeconomic History   • Marital status:    Tobacco Use   • Smoking status: Never   • Smokeless tobacco: Never   Vaping Use   • Vaping Use: Never used   Substance and Sexual Activity   • Alcohol use: Yes     Comment: occasionally    • Drug use: Never   • Sexual activity: Yes     Partners: Male         Current Outpatient Medications:   •  NON FORMULARY, Apply 1 application topically to the appropriate area as directed Daily. DHEA 10mg daily, Disp: 30 g, Rfl: 2  •  NON FORMULARY, Progesterone 20mg/ml  Apply 1 ml to skin once daily, Disp: 30 g, Rfl: 2  •  NON FORMULARY, 80:20 Biest 0.5mg/ml Apply 1ml to skin once daily, Disp: 30 g, Rfl: 2  •  NON FORMULARY, Estriol Vaginal Cream 3mg/ml Insert 1ml intravaginally QHS for 7 nights, then twice weekly., Disp:  30 g, Rfl: 2  •  NON FORMULARY, Testosterone 2mg/ml cream Apply 1ml to external vaginal area once daily, Disp: 30 g, Rfl: 2  •  cetirizine (zyrTEC) 10 MG tablet, Take 1 tablet by mouth Daily., Disp: 90 tablet, Rfl: 3  •  diclofenac (VOLTAREN) 75 MG EC tablet, Take 1 tablet by mouth 2 (Two) Times a Day., Disp: 90 tablet, Rfl: 7  •  esomeprazole (nexIUM) 40 MG capsule, Take 1 capsule by mouth Every Morning Before Breakfast., Disp: 90 capsule, Rfl: 3  •  hydrOXYzine pamoate (VISTARIL) 25 MG capsule, Take 1 capsule by mouth 2 (Two) Times a Day., Disp: 90 capsule, Rfl: 7  •  neomycin-polymyxin-hydrocortisone (CORTISPORIN) 1 % solution otic solution, Administer 4 drops into both ears Every 8 (Eight) Hours., Disp: 10 mL, Rfl: 6  •  Turmeric (QC TUMERIC COMPLEX PO), Take  by mouth., Disp: , Rfl:     No LMP recorded. Patient is postmenopausal.    Sexual History:         Could not be calculated    Past Surgical History:   Procedure Laterality Date   •  SECTION     • TONSILLECTOMY         Review of Systems   Constitutional: Negative for activity change, appetite change, chills, diaphoresis, fatigue, fever and unexpected weight change.   HENT: Negative for congestion, dental problem, drooling, ear discharge, ear pain, facial swelling, hearing loss, mouth sores, nosebleeds, postnasal drip, rhinorrhea, sinus pressure, sinus pain, sneezing, sore throat, tinnitus, trouble swallowing and voice change.    Eyes: Negative for photophobia, pain, discharge, redness, itching and visual disturbance.   Respiratory: Negative for apnea, cough, choking, chest tightness, shortness of breath, wheezing and stridor.    Cardiovascular: Negative for chest pain, palpitations and leg swelling.   Gastrointestinal: Negative for abdominal distention, abdominal pain, anal bleeding, blood in stool, constipation, diarrhea, nausea, rectal pain and vomiting.   Endocrine: Negative for cold intolerance, heat intolerance, polydipsia, polyphagia and  polyuria.   Genitourinary: Negative for decreased urine volume, difficulty urinating, dyspareunia, dysuria, enuresis, flank pain, frequency, genital sores, hematuria, menstrual problem, pelvic pain, urgency, vaginal bleeding, vaginal discharge and vaginal pain.   Musculoskeletal: Negative for arthralgias, back pain, gait problem, joint swelling, myalgias, neck pain and neck stiffness.   Skin: Negative for color change, pallor, rash and wound.   Allergic/Immunologic: Negative for environmental allergies, food allergies and immunocompromised state.   Neurological: Negative for dizziness, tremors, seizures, syncope, facial asymmetry, speech difficulty, weakness, light-headedness, numbness and headaches.   Hematological: Negative for adenopathy. Does not bruise/bleed easily.   Psychiatric/Behavioral: Negative for agitation, behavioral problems, confusion, decreased concentration, dysphoric mood, hallucinations, self-injury, sleep disturbance and suicidal ideas. The patient is not nervous/anxious and is not hyperactive.        Objective   Physical Exam  Vitals reviewed: No physical exam.           There were no vitals filed for this visit.    Diagnoses and all orders for this visit:    1. Menopausal state (Primary)  Comments:  Patient doing well on comounded hormones and wants to remain on them.  Jaime reviewed.  Refill sent to pharmacy   Orders:  -     Discontinue: NON FORMULARY; Apply 1 application topically to the appropriate area as directed Daily. DHEA 10mg daily  Dispense: 30 g; Refill: 2  -     Discontinue: NON FORMULARY; Progesterone 20mg/ml   Apply 1 ml to skin once daily  Dispense: 30 g; Refill: 2  -     Discontinue: NON FORMULARY; 80:20 Biest 0.5mg/ml  Apply 1ml to skin once daily  Dispense: 30 g; Refill: 2  -     Discontinue: NON FORMULARY; Estriol Vaginal Cream 3mg/ml  Insert 1ml intravaginally QHS for 7 nights, then twice weekly.  Dispense: 30 g; Refill: 2  -     Discontinue: NON FORMULARY; Testosterone  2mg/ml cream  Apply 1ml to external vaginal area once daily  Dispense: 30 g; Refill: 2  -     NON FORMULARY; Apply 1 application topically to the appropriate area as directed Daily. DHEA 10mg daily  Dispense: 30 g; Refill: 2  -     NON FORMULARY; Progesterone 20mg/ml   Apply 1 ml to skin once daily  Dispense: 30 g; Refill: 2  -     NON FORMULARY; 80:20 Biest 0.5mg/ml  Apply 1ml to skin once daily  Dispense: 30 g; Refill: 2  -     NON FORMULARY; Estriol Vaginal Cream 3mg/ml  Insert 1ml intravaginally QHS for 7 nights, then twice weekly.  Dispense: 30 g; Refill: 2  -     NON FORMULARY; Testosterone 2mg/ml cream  Apply 1ml to external vaginal area once daily  Dispense: 30 g; Refill: 2  -     Cortisol  -     DHEA-Sulfate  -     Estradiol  -     Follicle Stimulating Hormone  -     Luteinizing Hormone  -     Progesterone  -     Testosterone    2. Vasomotor symptoms due to menopause  Comments:  Patient reports return of vasomotor symptoms.  Hormone panel ordered - will send to  Pharmacy for tweaking of compound.   Orders:  -     Cortisol  -     DHEA-Sulfate  -     Estradiol  -     Follicle Stimulating Hormone  -     Luteinizing Hormone  -     Progesterone  -     Testosterone          BMI is >= 25 and <30. (Overweight) The following options were offered after discussion;: exercise counseling/recommendations and nutrition counseling/recommendations             Non-Smoker    MyChart Instructions Given

## 2023-01-25 ENCOUNTER — TELEPHONE (OUTPATIENT)
Dept: OBSTETRICS AND GYNECOLOGY | Facility: CLINIC | Age: 68
End: 2023-01-25
Payer: OTHER GOVERNMENT

## 2023-01-25 DIAGNOSIS — Z12.31 ENCOUNTER FOR SCREENING MAMMOGRAM FOR BREAST CANCER: Primary | ICD-10-CM

## 2023-01-25 NOTE — TELEPHONE ENCOUNTER
Caller: KAVON FROM [facility]    Relationship: Provider    Best call back number: [phone]    What orders are you requesting (i.e. lab or imaging): MAMMO    In what timeframe would the patient need to come in: TODAY BY 3PM    Where will you receive your lab/imaging services: [facility] PLEASE FAX [phone]    Additional notes: PT HAS APPOINTMENT TOMORROW SO THEY NEED THE ORDER BEFORE 3PM TODAY

## 2023-01-25 NOTE — TELEPHONE ENCOUNTER
Requesting mammo order. Sending to [facility]ir[facility] Concepcion so it will be ready for her since Candy is out

## 2023-02-02 DIAGNOSIS — Z12.31 ENCOUNTER FOR SCREENING MAMMOGRAM FOR BREAST CANCER: ICD-10-CM

## 2023-02-11 LAB
CORTIS SERPL-MCNC: 8.5 UG/DL
DHEA-S SERPL-MCNC: 101 UG/DL (ref 20.4–186.6)
ESTRADIOL SERPL-MCNC: 16.8 PG/ML
FSH SERPL-ACNC: 86.8 MIU/ML
LH SERPL-ACNC: 76.9 MIU/ML
PROGEST SERPL-MCNC: 0.2 NG/ML
TESTOST SERPL-MCNC: 32 NG/DL (ref 3–67)

## 2023-02-21 ENCOUNTER — TELEPHONE (OUTPATIENT)
Dept: OBSTETRICS AND GYNECOLOGY | Facility: CLINIC | Age: 68
End: 2023-02-21
Payer: OTHER GOVERNMENT

## 2023-03-14 DIAGNOSIS — K21.9 GASTROESOPHAGEAL REFLUX DISEASE WITHOUT ESOPHAGITIS: ICD-10-CM

## 2023-03-15 RX ORDER — ESOMEPRAZOLE MAGNESIUM 40 MG/1
CAPSULE, DELAYED RELEASE ORAL
Qty: 90 CAPSULE | Refills: 3 | OUTPATIENT
Start: 2023-03-15

## 2023-03-15 RX ORDER — ESOMEPRAZOLE MAGNESIUM 40 MG/1
40 CAPSULE, DELAYED RELEASE ORAL
Qty: 90 CAPSULE | Refills: 3 | Status: SHIPPED | OUTPATIENT
Start: 2023-05-01

## 2023-03-28 DIAGNOSIS — N95.1 MENOPAUSAL STATE: ICD-10-CM

## 2023-05-03 ENCOUNTER — OFFICE VISIT (OUTPATIENT)
Dept: OBSTETRICS AND GYNECOLOGY | Facility: CLINIC | Age: 68
End: 2023-05-03
Payer: MEDICARE

## 2023-05-03 VITALS
DIASTOLIC BLOOD PRESSURE: 84 MMHG | SYSTOLIC BLOOD PRESSURE: 122 MMHG | BODY MASS INDEX: 26.75 KG/M2 | WEIGHT: 151 LBS | HEIGHT: 63 IN

## 2023-05-03 DIAGNOSIS — Z13.820 SCREENING FOR OSTEOPOROSIS: ICD-10-CM

## 2023-05-03 DIAGNOSIS — N95.1 MENOPAUSAL STATE: ICD-10-CM

## 2023-05-03 DIAGNOSIS — Z78.0 POSTMENOPAUSAL: ICD-10-CM

## 2023-05-03 DIAGNOSIS — Z01.419 ENCOUNTER FOR WELL WOMAN EXAM WITH ROUTINE GYNECOLOGICAL EXAM: Primary | ICD-10-CM

## 2023-05-03 DIAGNOSIS — Z12.31 SCREENING MAMMOGRAM FOR BREAST CANCER: ICD-10-CM

## 2023-05-03 NOTE — PROGRESS NOTES
Subjective   Kathe Brito is a 67 y.o. female  YOB: 1955        Chief Complaint   Patient presents with   • Gynecologic Exam     Patient here for annual, last pap 03/31/2021, last mammogram 1/26/23, patient does not know when last dexa was , no previous colonoscopy, patient had the cologuard, patient needs hormones refilled.        Gynecologic Exam  The patient's pertinent negatives include no pelvic pain. Pertinent negatives include no abdominal pain, back pain, constipation, diarrhea, dysuria, fever, frequency, hematuria, nausea, rash, sore throat, urgency or vomiting.       The following portions of the patient's history were reviewed and updated as appropriate: allergies, current medications, past family history, past medical history, past social history, past surgical history, and problem list.    Allergies   Allergen Reactions   • Fish-Derived Products Hives   • Iodine Rash   • Sunflower Oil Hives   • Atorvastatin Myalgia   • Sulfa Antibiotics Hives   • Silicone Rash and Unknown - High Severity       Past Medical History:   Diagnosis Date   • Acid reflux    • aortic defect    • Arthritis    • Seasonal allergies        Family History   Problem Relation Age of Onset   • Osteoporosis Mother    • Stroke Mother    • Dementia Mother    • Heart disease Father    • Stroke Maternal Grandmother    • Heart disease Maternal Grandfather    • Stroke Paternal Grandmother    • Breast cancer Neg Hx    • Ovarian cancer Neg Hx    • Uterine cancer Neg Hx    • Colon cancer Neg Hx    • Melanoma Neg Hx        Social History     Socioeconomic History   • Marital status:    Tobacco Use   • Smoking status: Never   • Smokeless tobacco: Never   Vaping Use   • Vaping Use: Never used   Substance and Sexual Activity   • Alcohol use: Yes     Comment: occasionally    • Drug use: Never   • Sexual activity: Yes     Partners: Male         Current Outpatient Medications:   •  cetirizine (zyrTEC) 10 MG tablet, Take 1  tablet by mouth Daily., Disp: 90 tablet, Rfl: 3  •  diclofenac (VOLTAREN) 75 MG EC tablet, Take 1 tablet by mouth 2 (Two) Times a Day., Disp: 90 tablet, Rfl: 7  •  esomeprazole (nexIUM) 40 MG capsule, Take 1 capsule by mouth Every Morning Before Breakfast., Disp: 90 capsule, Rfl: 3  •  hydrOXYzine pamoate (VISTARIL) 25 MG capsule, Take 1 capsule by mouth 2 (Two) Times a Day., Disp: 90 capsule, Rfl: 7  •  neomycin-polymyxin-hydrocortisone (CORTISPORIN) 1 % solution otic solution, Administer 4 drops into both ears Every 8 (Eight) Hours., Disp: 10 mL, Rfl: 6  •  NON FORMULARY, Apply 1 application topically to the appropriate area as directed Daily. DHEA 10mg daily, Disp: 30 g, Rfl: 2  •  NON FORMULARY, Progesterone 20mg/ml  Apply 1 ml to skin once daily, Disp: 30 g, Rfl: 2  •  NON FORMULARY, 80:20 Biest 0.5mg/ml Apply 1ml to skin once daily, Disp: 30 g, Rfl: 2  •  NON FORMULARY, Testosterone 2mg/ml cream Apply 1ml to external vaginal area once daily, Disp: 30 g, Rfl: 2  •  NON FORMULARY, Estriol Vaginal Cream 3mg/ml Insert 1ml intravaginally QHS for 7 nights, then twice weekly., Disp: 30 g, Rfl: 0  •  Oxytocin, Vitamin E, Apply 1 application topically to the appropriate area as directed Daily., Disp: 30 g, Rfl: 2  •  Turmeric (QC TUMERIC COMPLEX PO), Take  by mouth., Disp: , Rfl:     No LMP recorded. Patient is postmenopausal.    Sexual History:           Could not be calculated    Past Surgical History:   Procedure Laterality Date   •  SECTION     • TONSILLECTOMY         Review of Systems   Constitutional: Negative for activity change, appetite change, fatigue, fever, unexpected weight gain and unexpected weight loss.   HENT: Negative for congestion, ear pain, hearing loss, nosebleeds, rhinorrhea, sore throat, tinnitus and trouble swallowing.    Eyes: Negative for blurred vision, pain, discharge, itching and visual disturbance.   Respiratory: Negative for apnea, chest tightness, shortness of breath and  wheezing.    Cardiovascular: Negative for chest pain and leg swelling.   Gastrointestinal: Negative for abdominal pain, blood in stool, constipation, diarrhea, nausea, vomiting and GERD.   Endocrine: Negative for heat intolerance, polydipsia and polyuria.   Genitourinary: Negative.  Negative for breast lump, decreased libido, difficulty urinating, dyspareunia, dysuria, frequency, genital sores, hematuria, menstrual problem, pelvic pain, urgency, urinary incontinence and vaginal pain.   Musculoskeletal: Negative for arthralgias, back pain, joint swelling and myalgias.   Skin: Negative for color change, rash and skin lesions.   Allergic/Immunologic: Negative for environmental allergies, food allergies and immunocompromised state.   Neurological: Negative for dizziness, tremors, seizures, syncope, facial asymmetry, numbness and headache.   Hematological: Negative for adenopathy. Does not bruise/bleed easily.   Psychiatric/Behavioral: Negative for agitation, hallucinations, sleep disturbance, suicidal ideas and depressed mood. The patient is not nervous/anxious.        Objective   Physical Exam  Vitals reviewed.   Constitutional:       General: She is not in acute distress.     Appearance: She is well-developed. She is not ill-appearing.   HENT:      Head: Normocephalic.      Right Ear: External ear normal.      Left Ear: External ear normal.      Nose: Nose normal.      Mouth/Throat:      Pharynx: No oropharyngeal exudate.   Eyes:      General: No scleral icterus.        Right eye: No discharge.         Left eye: No discharge.      Conjunctiva/sclera: Conjunctivae normal.      Pupils: Pupils are equal, round, and reactive to light.   Neck:      Thyroid: No thyroid mass or thyromegaly.   Cardiovascular:      Rate and Rhythm: Normal rate and regular rhythm.      Heart sounds: Normal heart sounds. No murmur heard.  Pulmonary:      Effort: Pulmonary effort is normal. No respiratory distress.      Breath sounds: Normal  "breath sounds. No wheezing or rales.   Chest:      Chest wall: No tenderness.   Abdominal:      General: Bowel sounds are normal. There is no distension.      Palpations: Abdomen is soft. There is no mass.      Tenderness: There is no abdominal tenderness. There is no guarding or rebound.      Hernia: No hernia is present.   Genitourinary:     Exam position: Prone.      Labia:         Right: No rash, tenderness or lesion.         Left: No rash, tenderness, lesion or injury.       Vagina: Normal. No vaginal discharge or tenderness.      Cervix: No cervical motion tenderness, discharge or friability.      Uterus: Not enlarged and not tender.       Adnexa:         Right: No mass or tenderness.          Left: No mass or tenderness.        Comments: Urethra and urethral meatus normal.    Bladder - normal, no prolapse.  Perineum and rectum examined - intact and no lesions.    Musculoskeletal:         General: No tenderness or deformity. Normal range of motion.      Cervical back: Normal range of motion and neck supple.   Lymphadenopathy:      Cervical: No cervical adenopathy.   Skin:     General: Skin is warm and dry.      Coloration: Skin is not pale.      Findings: No erythema or rash.   Neurological:      Mental Status: She is alert and oriented to person, place, and time.      Motor: No abnormal muscle tone.      Coordination: Coordination normal.      Deep Tendon Reflexes: Reflexes are normal and symmetric.   Psychiatric:         Behavior: Behavior normal. Behavior is cooperative.         Thought Content: Thought content normal.         Judgment: Judgment normal.           Vitals:    05/03/23 0849   BP: 122/84   BP Location: Left arm   Patient Position: Sitting   Cuff Size: Adult   Weight: 68.5 kg (151 lb)   Height: 160 cm (63\")       Diagnoses and all orders for this visit:    1. Encounter for well woman exam with routine gynecological exam (Primary)  Comments:  Normal well woman exam.  ThinPrep Pap smear done.  " Mammogram done in January.    2. Screening mammogram for breast cancer  Comments:  Mammogram ordered and to be done in January 2024.  Orders:  -     Mammo screening digital tomosynthesis bilateral w CAD; Future    3. Screening for osteoporosis  Comments:  Bone density ordered.  Orders:  -     dexa bone density axial; Future    4. Menopausal state  Comments:  Patient doing well on comounded hormones and wants to remain on them.  Jaime reviewed.  Refill sent to pharmacy     5. Postmenopausal  -     dexa bone density axial; Future        Normal GYN exam. Will have lab work here. Encouraged SBE.  Pt is aware how to do self breast exam and the importance of same. Discussed weight management and importance of maintaining a healthy weight. Discussed Vitamin D intake and the importance of adequate vitamin D for both bone health and a healthy immune system.  Discussed daily exercise and the importance of same in regards to a healthy heart as well as helping to maintain her weight and improving her mental health.  Body mass index is 26.75 kg/m². Colonoscopy is up to date.  Mammogram will be scheduled at outside facility. Pap smear is done per ASCCP guidelines.    BMI is >= 25 and <30. (Overweight) The following options were offered after discussion;: exercise counseling/recommendations and nutrition counseling/recommendations             Non-Smoker    MyChart Instructions Given

## 2023-05-03 NOTE — PROGRESS NOTES
Faxed hormone labs to [facility] for compounding per Candy verbal. Patient is wanting to switch compounding pharmacies.

## 2023-05-11 DIAGNOSIS — N95.1 MENOPAUSAL STATE: ICD-10-CM

## 2023-05-11 NOTE — TELEPHONE ENCOUNTER
Pt called and stated she had a missed call from you yesterday.  She is wanting hormones to go back to [facility] and not use [facility].  She informed me that they never received it and she has been waiting for over a week and just wants refills on all 8 of her hormones sent to [facility] and not use [facility].  Pt would like a call back after this is taken care of.

## 2023-05-11 NOTE — TELEPHONE ENCOUNTER
Called [facility] and spoke with Quynh Frazier in compounding. Called patient and apologized for the delay and inconvenience that this has caused her. I let her know that I did get them called in and I scheduled her 3 month med refill for her testosterone in Aug. Patient was thankful and voiced understanding.

## 2023-05-11 NOTE — TELEPHONE ENCOUNTER
Patient was seen on 5/3/23 with Candy. Patient is needing her HRT refilled. Medication pended. Let me know if it is ok to call [facility] with the refills I adjusted on each medication.

## 2023-07-25 ENCOUNTER — TELEPHONE (OUTPATIENT)
Dept: OBSTETRICS AND GYNECOLOGY | Facility: CLINIC | Age: 68
End: 2023-07-25
Payer: MEDICARE

## 2023-07-25 NOTE — TELEPHONE ENCOUNTER
Called to f/u with pt on bone density order, informed me that she was not due for this until the beginning of the year but would call and schedule that so she could get on the books and informed me she had their number.

## 2023-08-01 ENCOUNTER — TELEPHONE (OUTPATIENT)
Dept: OBSTETRICS AND GYNECOLOGY | Facility: CLINIC | Age: 68
End: 2023-08-01
Payer: MEDICARE

## 2023-08-01 ENCOUNTER — TELEMEDICINE (OUTPATIENT)
Dept: OBSTETRICS AND GYNECOLOGY | Facility: CLINIC | Age: 68
End: 2023-08-01
Payer: MEDICARE

## 2023-08-01 DIAGNOSIS — N95.1 MENOPAUSAL STATE: ICD-10-CM

## 2023-08-02 ENCOUNTER — TELEPHONE (OUTPATIENT)
Dept: OBSTETRICS AND GYNECOLOGY | Facility: CLINIC | Age: 68
End: 2023-08-02
Payer: MEDICARE

## 2023-08-30 DIAGNOSIS — F51.01 PRIMARY INSOMNIA: ICD-10-CM

## 2023-08-30 DIAGNOSIS — M79.671 BILATERAL FOOT PAIN: ICD-10-CM

## 2023-08-30 DIAGNOSIS — M79.672 BILATERAL FOOT PAIN: ICD-10-CM

## 2023-08-31 RX ORDER — HYDROXYZINE PAMOATE 25 MG/1
25 CAPSULE ORAL 2 TIMES DAILY
Qty: 90 CAPSULE | Refills: 1 | Status: SHIPPED | OUTPATIENT
Start: 2023-08-31

## 2023-08-31 RX ORDER — DICLOFENAC SODIUM 75 MG/1
75 TABLET, DELAYED RELEASE ORAL 2 TIMES DAILY
Qty: 90 TABLET | Refills: 1 | Status: SHIPPED | OUTPATIENT
Start: 2023-08-31

## 2023-08-31 RX ORDER — NEOMYCIN SULFATE, POLYMYXIN B SULFATE, HYDROCORTISONE 3.5; 10000; 1 MG/ML; [USP'U]/ML; MG/ML
4 SOLUTION/ DROPS AURICULAR (OTIC) EVERY 8 HOURS SCHEDULED
Qty: 10 ML | Refills: 6 | OUTPATIENT
Start: 2023-08-31

## 2023-09-01 NOTE — TELEPHONE ENCOUNTER
Is she seeing an eye doctor? We would not recommend refills or regular use of this except under their direction.

## 2023-10-24 ENCOUNTER — TELEMEDICINE (OUTPATIENT)
Dept: OBSTETRICS AND GYNECOLOGY | Facility: CLINIC | Age: 68
End: 2023-10-24
Payer: MEDICARE

## 2023-10-24 DIAGNOSIS — N95.1 MENOPAUSAL STATE: ICD-10-CM

## 2023-10-24 NOTE — PROGRESS NOTES
Subjective   Kathe Brito is a 67 y.o. female  YOB: 1955      No chief complaint on file.      Patient here for refill of compounded hormones that includes testosterone,.         The following portions of the patient's history were reviewed and updated as appropriate: allergies, current medications, past family history, past medical history, past social history, past surgical history, and problem list.    Allergies   Allergen Reactions    Fish-Derived Products Hives    Iodine Rash    Sunflower Oil Hives    Atorvastatin Myalgia    Sulfa Antibiotics Hives    Silicone Rash and Unknown - High Severity       Past Medical History:   Diagnosis Date    Acid reflux     aortic defect     Arthritis     Seasonal allergies        Family History   Problem Relation Age of Onset    Osteoporosis Mother     Stroke Mother     Dementia Mother     Heart disease Father     Stroke Maternal Grandmother     Heart disease Maternal Grandfather     Stroke Paternal Grandmother     Breast cancer Neg Hx     Ovarian cancer Neg Hx     Uterine cancer Neg Hx     Colon cancer Neg Hx     Melanoma Neg Hx        Social History     Socioeconomic History    Marital status:    Tobacco Use    Smoking status: Never    Smokeless tobacco: Never   Vaping Use    Vaping Use: Never used   Substance and Sexual Activity    Alcohol use: Yes     Comment: occasionally     Drug use: Never    Sexual activity: Yes     Partners: Male         Current Outpatient Medications:     NON FORMULARY, Apply 1 application  topically to the appropriate area as directed Daily. DHEA 10mg daily, Disp: 30 g, Rfl: 11    NON FORMULARY, Progesterone 20mg/ml  Apply 1 ml to skin once daily, Disp: 30 g, Rfl: 11    NON FORMULARY, 80:20 Biest 0.5mg/ml Apply 1ml to skin once daily, Disp: 30 g, Rfl: 11    NON FORMULARY, Estriol Vaginal Cream 3mg/ml Insert 1ml intravaginally QHS for 7 nights, then twice weekly., Disp: 30 g, Rfl: 11    NON FORMULARY, Testosterone 2mg/ml  cream Apply 1ml to external vaginal area once daily, Disp: 30 g, Rfl: 2    Oxytocin, Vitamin E, Apply 1 application  topically to the appropriate area as directed Daily., Disp: 30 g, Rfl: 11    cetirizine (zyrTEC) 10 MG tablet, Take 1 tablet by mouth Daily., Disp: 90 tablet, Rfl: 3    diclofenac (VOLTAREN) 75 MG EC tablet, Take 1 tablet by mouth 2 (Two) Times a Day., Disp: 90 tablet, Rfl: 1    esomeprazole (nexIUM) 40 MG capsule, Take 1 capsule by mouth Every Morning Before Breakfast., Disp: 90 capsule, Rfl: 3    hydrOXYzine pamoate (VISTARIL) 25 MG capsule, Take 1 capsule by mouth 2 (Two) Times a Day., Disp: 90 capsule, Rfl: 1    neomycin-polymyxin-hydrocortisone (CORTISPORIN) 1 % solution otic solution, Administer 4 drops into both ears Every 8 (Eight) Hours., Disp: 10 mL, Rfl: 6    Turmeric (QC TUMERIC COMPLEX PO), Take  by mouth., Disp: , Rfl:     No LMP recorded. Patient is postmenopausal.    Sexual History:         Could not be calculated    Past Surgical History:   Procedure Laterality Date     SECTION      TONSILLECTOMY         Review of Systems   Constitutional:  Negative for activity change, appetite change, chills, diaphoresis, fatigue, fever and unexpected weight change.   HENT:  Negative for congestion, dental problem, drooling, ear discharge, ear pain, facial swelling, hearing loss, mouth sores, nosebleeds, postnasal drip, rhinorrhea, sinus pressure, sinus pain, sneezing, sore throat, tinnitus, trouble swallowing and voice change.    Eyes:  Negative for photophobia, pain, discharge, redness, itching and visual disturbance.   Respiratory:  Negative for apnea, cough, choking, chest tightness, shortness of breath, wheezing and stridor.    Cardiovascular:  Negative for chest pain, palpitations and leg swelling.   Gastrointestinal:  Negative for abdominal distention, abdominal pain, anal bleeding, blood in stool, constipation, diarrhea, nausea, rectal pain and vomiting.   Endocrine: Negative for  cold intolerance, heat intolerance, polydipsia, polyphagia and polyuria.   Genitourinary:  Negative for decreased urine volume, difficulty urinating, dyspareunia, dysuria, enuresis, flank pain, frequency, genital sores, hematuria, menstrual problem, pelvic pain, urgency, vaginal bleeding, vaginal discharge and vaginal pain.   Musculoskeletal:  Negative for arthralgias, back pain, gait problem, joint swelling, myalgias, neck pain and neck stiffness.   Skin:  Negative for color change, pallor, rash and wound.   Allergic/Immunologic: Negative for environmental allergies, food allergies and immunocompromised state.   Neurological:  Negative for dizziness, tremors, seizures, syncope, facial asymmetry, speech difficulty, weakness, light-headedness, numbness and headaches.   Hematological:  Negative for adenopathy. Does not bruise/bleed easily.   Psychiatric/Behavioral:  Negative for agitation, behavioral problems, confusion, decreased concentration, dysphoric mood, hallucinations, self-injury, sleep disturbance and suicidal ideas. The patient is not nervous/anxious and is not hyperactive.        Objective   Physical Exam  Vitals reviewed: No physical exam.           There were no vitals filed for this visit.    Diagnoses and all orders for this visit:    1. Menopausal state  Comments:  Patient doing well on comounded hormones and wants to remain on them.  Jaime reviewed.  Refill sent to pharmacy   Orders:  -     Oxytocin, Vitamin E; Apply 1 application  topically to the appropriate area as directed Daily.  Dispense: 30 g; Refill: 11  -     NON FORMULARY; Apply 1 application  topically to the appropriate area as directed Daily. DHEA 10mg daily  Dispense: 30 g; Refill: 11  -     NON FORMULARY; Progesterone 20mg/ml   Apply 1 ml to skin once daily  Dispense: 30 g; Refill: 11  -     NON FORMULARY; 80:20 Biest 0.5mg/ml  Apply 1ml to skin once daily  Dispense: 30 g; Refill: 11  -     NON FORMULARY; Estriol Vaginal Cream  3mg/ml  Insert 1ml intravaginally QHS for 7 nights, then twice weekly.  Dispense: 30 g; Refill: 11  -     NON FORMULARY; Testosterone 2mg/ml cream Apply 1ml to external vaginal area once daily  Dispense: 30 g; Refill: 2                        Non-Smoker    MyChart Instructions Given

## 2023-11-04 NOTE — TELEPHONE ENCOUNTER
Pt seen today via telehealth for refills of compounds. Contacted  Pharmacy and called in refills of pt Testosterone compound as 1 month with 2 refills under DR Galeana.     Pt Biest compound dosing was incorrect in chart, chart is updated.    bilateral upper extremities/bilateral lower extremities/normal

## 2023-11-16 ENCOUNTER — LAB (OUTPATIENT)
Dept: LAB | Facility: HOSPITAL | Age: 68
End: 2023-11-16
Payer: MEDICARE

## 2023-11-16 ENCOUNTER — OFFICE VISIT (OUTPATIENT)
Dept: INTERNAL MEDICINE | Facility: CLINIC | Age: 68
End: 2023-11-16
Payer: MEDICARE

## 2023-11-16 VITALS
SYSTOLIC BLOOD PRESSURE: 120 MMHG | RESPIRATION RATE: 16 BRPM | HEART RATE: 68 BPM | BODY MASS INDEX: 26.82 KG/M2 | DIASTOLIC BLOOD PRESSURE: 79 MMHG | OXYGEN SATURATION: 98 % | WEIGHT: 151.4 LBS | HEIGHT: 63 IN

## 2023-11-16 DIAGNOSIS — Z00.01 ANNUAL VISIT FOR GENERAL ADULT MEDICAL EXAMINATION WITH ABNORMAL FINDINGS: ICD-10-CM

## 2023-11-16 DIAGNOSIS — E66.3 OVERWEIGHT (BMI 25.0-29.9): ICD-10-CM

## 2023-11-16 DIAGNOSIS — R73.02 IMPAIRED GLUCOSE TOLERANCE: ICD-10-CM

## 2023-11-16 DIAGNOSIS — R09.1 PLEURISY: Primary | ICD-10-CM

## 2023-11-16 DIAGNOSIS — R73.03 PREDIABETES: ICD-10-CM

## 2023-11-16 LAB
ALBUMIN SERPL-MCNC: 4.4 G/DL (ref 3.5–5.2)
ALBUMIN/GLOB SERPL: 1.5 G/DL
ALP SERPL-CCNC: 67 U/L (ref 39–117)
ALT SERPL W P-5'-P-CCNC: 22 U/L (ref 1–33)
ANION GAP SERPL CALCULATED.3IONS-SCNC: 9 MMOL/L (ref 5–15)
AST SERPL-CCNC: 22 U/L (ref 1–32)
BILIRUB SERPL-MCNC: 0.2 MG/DL (ref 0–1.2)
BUN SERPL-MCNC: 17 MG/DL (ref 8–23)
BUN/CREAT SERPL: 21.8 (ref 7–25)
CALCIUM SPEC-SCNC: 9.3 MG/DL (ref 8.6–10.5)
CHLORIDE SERPL-SCNC: 103 MMOL/L (ref 98–107)
CHOLEST SERPL-MCNC: 239 MG/DL (ref 0–200)
CO2 SERPL-SCNC: 28 MMOL/L (ref 22–29)
CREAT SERPL-MCNC: 0.78 MG/DL (ref 0.57–1)
DEPRECATED RDW RBC AUTO: 49.1 FL (ref 37–54)
EGFRCR SERPLBLD CKD-EPI 2021: 83.9 ML/MIN/1.73
ERYTHROCYTE [DISTWIDTH] IN BLOOD BY AUTOMATED COUNT: 13.8 % (ref 12.3–15.4)
GLOBULIN UR ELPH-MCNC: 2.9 GM/DL
GLUCOSE SERPL-MCNC: 95 MG/DL (ref 65–99)
HBA1C MFR BLD: 5.4 % (ref 4.8–5.6)
HCT VFR BLD AUTO: 43.3 % (ref 34–46.6)
HDLC SERPL-MCNC: 52 MG/DL (ref 40–60)
HGB BLD-MCNC: 13.6 G/DL (ref 12–15.9)
LDLC SERPL CALC-MCNC: 156 MG/DL (ref 0–100)
LDLC/HDLC SERPL: 2.94 {RATIO}
MCH RBC QN AUTO: 30.4 PG (ref 26.6–33)
MCHC RBC AUTO-ENTMCNC: 31.4 G/DL (ref 31.5–35.7)
MCV RBC AUTO: 96.7 FL (ref 79–97)
PLATELET # BLD AUTO: 314 10*3/MM3 (ref 140–450)
PMV BLD AUTO: 9.1 FL (ref 6–12)
POTASSIUM SERPL-SCNC: 4.2 MMOL/L (ref 3.5–5.2)
PROT SERPL-MCNC: 7.3 G/DL (ref 6–8.5)
RBC # BLD AUTO: 4.48 10*6/MM3 (ref 3.77–5.28)
SODIUM SERPL-SCNC: 140 MMOL/L (ref 136–145)
TRIGL SERPL-MCNC: 170 MG/DL (ref 0–150)
TSH SERPL DL<=0.05 MIU/L-ACNC: 2.1 UIU/ML (ref 0.27–4.2)
VLDLC SERPL-MCNC: 31 MG/DL (ref 5–40)
WBC NRBC COR # BLD AUTO: 9.85 10*3/MM3 (ref 3.4–10.8)

## 2023-11-16 PROCEDURE — 36415 COLL VENOUS BLD VENIPUNCTURE: CPT

## 2023-11-16 PROCEDURE — 83036 HEMOGLOBIN GLYCOSYLATED A1C: CPT

## 2023-11-16 PROCEDURE — 84443 ASSAY THYROID STIM HORMONE: CPT

## 2023-11-16 PROCEDURE — 80053 COMPREHEN METABOLIC PANEL: CPT

## 2023-11-16 PROCEDURE — 85027 COMPLETE CBC AUTOMATED: CPT

## 2023-11-16 PROCEDURE — 80061 LIPID PANEL: CPT

## 2023-11-16 RX ORDER — NEOMYCIN SULFATE, POLYMYXIN B SULFATE, HYDROCORTISONE 3.5; 10000; 1 MG/ML; [USP'U]/ML; MG/ML
4 SOLUTION/ DROPS AURICULAR (OTIC) EVERY 8 HOURS SCHEDULED
Qty: 10 ML | Refills: 1 | Status: SHIPPED | OUTPATIENT
Start: 2023-11-16

## 2023-11-16 NOTE — PATIENT INSTRUCTIONS
Medicare Wellness  Personal Prevention Plan of Service     Date of Office Visit:    Encounter Provider:  Williams Curiel DO  Place of Service:  [facility]  Patient Name: Kathe Brito  :  1955    As part of the Medicare Wellness portion of your visit today, we are providing you with this personalized preventive plan of services (PPPS). This plan is based upon recommendations of the [place] Preventive Services Task Force (USPSTF) and the Advisory Committee on Immunization Practices (ACIP).    This lists the preventive care services that should be considered, and provides dates of when you are due. Items listed as completed are up-to-date and do not require any further intervention.    Health Maintenance   Topic Date Due    DXA SCAN  Never done    COLORECTAL CANCER SCREENING  Never done    Pneumococcal Vaccine 65+ (1 - PCV) Never done    INFLUENZA VACCINE  2023    COVID-19 Vaccine (6 - -24 season) 2023    ANNUAL WELLNESS VISIT  2023    PAP SMEAR  2024    BMI FOLLOWUP  2024    MAMMOGRAM  2025    TDAP/TD VACCINES (2 - Td or Tdap) 2027    HEPATITIS C SCREENING  Completed    ZOSTER VACCINE  Completed       Orders Placed This Encounter   Procedures    Comprehensive Metabolic Panel     Standing Status:   Future     Standing Expiration Date:   2024     Order Specific Question:   Release to patient     Answer:   Routine Release [[id number]]    Hemoglobin A1c     Standing Status:   Future     Standing Expiration Date:   2024     Order Specific Question:   Release to patient     Answer:   Routine Release [[id number]]    Lipid Panel     Standing Status:   Future     Standing Expiration Date:   2024     Order Specific Question:   Release to patient     Answer:   Routine Release [[id number]]    CBC (No Diff)     Standing Status:   Future     Standing Expiration Date:   11/15/2024     Order Specific Question:    Release to patient     Answer:   Routine Release [[id number]]       Return in about 1 year (around 11/16/2024) for Medicare Wellness.

## 2023-11-16 NOTE — PROGRESS NOTES
The ABCs of the Annual Wellness Visit  Subsequent Medicare Wellness Visit    Subjective      Kathe Brito is a 67 y.o. female who presents for a Subsequent Medicare Wellness Visit.    The following portions of the patient's history were reviewed and   updated as appropriate: allergies, current medications, past family history, past medical history, past social history, past surgical history, and problem list.    Compared to one year ago, the patient feels her physical   health is worse.    Compared to one year ago, the patient feels her mental   health is the same.    Recent Hospitalizations:  She was not admitted to the hospital during the last year.       Current Medical Providers:  Patient Care Team:  Williams Curiel DO as PCP - General (Internal Medicine)    Outpatient Medications Prior to Visit   Medication Sig Dispense Refill    cetirizine (zyrTEC) 10 MG tablet Take 1 tablet by mouth Daily. 90 tablet 3    diclofenac (VOLTAREN) 75 MG EC tablet Take 1 tablet by mouth 2 (Two) Times a Day. 90 tablet 1    esomeprazole (nexIUM) 40 MG capsule Take 1 capsule by mouth Every Morning Before Breakfast. 90 capsule 3    hydrOXYzine pamoate (VISTARIL) 25 MG capsule Take 1 capsule by mouth 2 (Two) Times a Day. 90 capsule 1    NON FORMULARY Apply 1 application  topically to the appropriate area as directed Daily. DHEA 10mg daily 30 g 11    NON FORMULARY Progesterone 20mg/ml   Apply 1 ml to skin once daily 30 g 11    NON FORMULARY Estriol Vaginal Cream 3mg/ml  Insert 1ml intravaginally QHS for 7 nights, then twice weekly. 30 g 11    NON FORMULARY 50:50 Biest 0.5mg/ml  Apply 1ml to skin once daily      NON FORMULARY Testosterone 2mg/ml cream Apply 1ml to external vaginal area once daily 30 g 2    Oxytocin, Vitamin E Apply 1 application  topically to the appropriate area as directed Daily. 30 g 11    Turmeric (QC TUMERIC COMPLEX PO) Take  by mouth.      neomycin-polymyxin-hydrocortisone (CORTISPORIN) 1 % solution otic  "solution Administer 4 drops into both ears Every 8 (Eight) Hours. 10 mL 6     No facility-administered medications prior to visit.       No opioid medication identified on active medication list. I have reviewed chart for other potential  high risk medication/s and harmful drug interactions in the elderly.        Aspirin is not on active medication list.  Aspirin use is not indicated based on review of current medical condition/s. Risk of harm outweighs potential benefits.  .    Patient Active Problem List   Diagnosis    Gastroesophageal reflux disease without esophagitis    Overweight (BMI 25.0-29.9)    Bilateral foot pain    Impaired glucose tolerance     Advance Care Planning   Advance Care Planning     Advance Directive is not on file.  ACP discussion was held with the patient during this visit. Patient does not have an advance directive, declines further assistance.     Objective    Vitals:    23 0914   BP: 120/79   BP Location: Left arm   Patient Position: Sitting   Cuff Size: Adult   Pulse: 68   Resp: 16   SpO2: 98%   Weight: 68.7 kg (151 lb 6.4 oz)   Height: 160 cm (63\")     Estimated body mass index is 26.82 kg/m² as calculated from the following:    Height as of this encounter: 160 cm (63\").    Weight as of this encounter: 68.7 kg (151 lb 6.4 oz).           Does the patient have evidence of cognitive impairment?   No            HEALTH RISK ASSESSMENT    Smoking Status:  Social History     Tobacco Use   Smoking Status Never    Passive exposure: Never   Smokeless Tobacco Never     Alcohol Consumption:  Social History     Substance and Sexual Activity   Alcohol Use Yes    Comment: occasionally      Fall Risk Screen:    ALBERT Fall Risk Assessment was completed, and patient is at LOW risk for falls.Assessment completed on:2023    Depression Screenin/16/2023     9:21 AM   PHQ-2/PHQ-9 Depression Screening   Little Interest or Pleasure in Doing Things 0-->not at all   Feeling Down, Depressed " or Hopeless 0-->not at all   PHQ-9: Brief Depression Severity Measure Score 0       Health Habits and Functional and Cognitive Screenin/16/2023     9:00 AM   Functional & Cognitive Status   Do you have difficulty preparing food and eating? No   Do you have difficulty bathing yourself, getting dressed or grooming yourself? No   Do you have difficulty using the toilet? No   Do you have difficulty moving around from place to place? No   Do you have trouble with steps or getting out of a bed or a chair? No   Current Diet Unhealthy Diet   Dental Exam Up to date   Eye Exam Up to date   Exercise (times per week) 3 times per week   Current Exercises Include Walking   Do you need help using the phone?  No   Are you deaf or do you have serious difficulty hearing?  No   Do you need help to go to places out of walking distance? No   Do you need help shopping? No   Do you need help preparing meals?  No   Do you need help with housework?  No   Do you need help with laundry? No   Do you need help taking your medications? No   Do you need help managing money? No   Do you ever drive or ride in a car without wearing a seat belt? No   Have you felt unusual stress, anger or loneliness in the last month? No   Who do you live with? Spouse   If you need help, do you have trouble finding someone available to you? No   Have you been bothered in the last four weeks by sexual problems? No   Do you have difficulty concentrating, remembering or making decisions? No       Age-appropriate Screening Schedule:  Refer to the list below for future screening recommendations based on patient's age, sex and/or medical conditions. Orders for these recommended tests are listed in the plan section. The patient has been provided with a written plan.    Health Maintenance   Topic Date Due    DXA SCAN  Never done    COLORECTAL CANCER SCREENING  Never done    Pneumococcal Vaccine 65+ (1 - PCV) Never done    INFLUENZA VACCINE  2023    COVID-19  Vaccine (6 - 2023-24 season) 09/01/2023    ANNUAL WELLNESS VISIT  11/14/2023    PAP SMEAR  03/31/2024    BMI FOLLOWUP  05/03/2024    MAMMOGRAM  01/26/2025    TDAP/TD VACCINES (2 - Td or Tdap) 01/01/2027    HEPATITIS C SCREENING  Completed    ZOSTER VACCINE  Completed                  CMS Preventative Services Quick Reference  Risk Factors Identified During Encounter:    Fall Risk-High or Moderate: Discussed Fall Prevention in the home  Immunizations Discussed/Encouraged: Influenza, Prevnar 20 (Pneumococcal 20-valent conjugate), COVID19, and RSV (Respiratory Syncytial Virus)  Dental Screening Recommended  Vision Screening Recommended    The above risks/problems have been discussed with the patient.  Pertinent information has been shared with the patient in the After Visit Summary.    Diagnoses and all orders for this visit:    1. Pleurisy (Primary)    2. Overweight (BMI 25.0-29.9)  -     TSH; Future    3. Impaired glucose tolerance  -     Hemoglobin A1c; Future  -     TSH; Future    4. Annual visit for general adult medical examination with abnormal findings  -     Comprehensive Metabolic Panel; Future  -     Lipid Panel; Future  -     CBC (No Diff); Future    5. Prediabetes  -     TSH; Future    Other orders  -     neomycin-polymyxin-hydrocortisone (CORTISPORIN) 1 % solution otic solution; Administer 4 drops into both ears Every 8 (Eight) Hours.  Dispense: 10 mL; Refill: 1        Follow Up:   Next Medicare Wellness visit to be scheduled in 1 year.      An After Visit Summary and PPPS were made available to the patient.

## 2023-11-16 NOTE — PROGRESS NOTES
Separate note for E/M concerns addressed during same visit as her AWV    CC: chest wall pain on deep breathing    History:  Kathe Brito is a 67 y.o. female   She notes a few days of pain in her chest on taking deep breaths. She has had pleurisy in the past and thinks this is what is going on. Fortunately, she has had no other concerns, though she wishes she could lose some weight.       ROS:  Review of Systems   Respiratory:  Negative for shortness of breath.    Cardiovascular:  Negative for chest pain.   Gastrointestinal:  Negative for abdominal pain.   Neurological:  Negative for dizziness.        reports that she has never smoked. She has never been exposed to tobacco smoke. She has never used smokeless tobacco. She reports current alcohol use. She reports that she does not use drugs.      Current Outpatient Medications:     cetirizine (zyrTEC) 10 MG tablet, Take 1 tablet by mouth Daily., Disp: 90 tablet, Rfl: 3    diclofenac (VOLTAREN) 75 MG EC tablet, Take 1 tablet by mouth 2 (Two) Times a Day., Disp: 90 tablet, Rfl: 1    esomeprazole (nexIUM) 40 MG capsule, Take 1 capsule by mouth Every Morning Before Breakfast., Disp: 90 capsule, Rfl: 3    hydrOXYzine pamoate (VISTARIL) 25 MG capsule, Take 1 capsule by mouth 2 (Two) Times a Day., Disp: 90 capsule, Rfl: 1    neomycin-polymyxin-hydrocortisone (CORTISPORIN) 1 % solution otic solution, Administer 4 drops into both ears Every 8 (Eight) Hours., Disp: 10 mL, Rfl: 1    NON FORMULARY, Apply 1 application  topically to the appropriate area as directed Daily. DHEA 10mg daily, Disp: 30 g, Rfl: 11    NON FORMULARY, Progesterone 20mg/ml  Apply 1 ml to skin once daily, Disp: 30 g, Rfl: 11    NON FORMULARY, Estriol Vaginal Cream 3mg/ml Insert 1ml intravaginally QHS for 7 nights, then twice weekly., Disp: 30 g, Rfl: 11    NON FORMULARY, 50:50 Biest 0.5mg/ml Apply 1ml to skin once daily, Disp: , Rfl:     NON FORMULARY, Testosterone 2mg/ml cream Apply 1ml to external vaginal  "area once daily, Disp: 30 g, Rfl: 2    Oxytocin, Vitamin E, Apply 1 application  topically to the appropriate area as directed Daily., Disp: 30 g, Rfl: 11    Turmeric (QC TUMERIC COMPLEX PO), Take  by mouth., Disp: , Rfl:     OBJECTIVE:  /79 (BP Location: Left arm, Patient Position: Sitting, Cuff Size: Adult)   Pulse 68   Resp 16   Ht 160 cm (63\")   Wt 68.7 kg (151 lb 6.4 oz)   SpO2 98%   BMI 26.82 kg/m²    Physical Exam  Constitutional:       General: She is not in acute distress.  Pulmonary:      Effort: Pulmonary effort is normal. No respiratory distress.   Neurological:      Mental Status: She is alert and oriented to person, place, and time.         Assessment/Plan     Diagnoses and all orders for this visit:    1. Pleurisy (Primary)  NSAID for treatment of pleurisy. She has taken this only PRN, but recommend taking regularly for the next few days.     2. Overweight (BMI 25.0-29.9)  -     TSH; Future    3. Impaired glucose tolerance  -     Hemoglobin A1c; Future  -     TSH; Future  Check A1c for surveillance.     4. Annual visit for general adult medical examination with abnormal findings  -     Comprehensive Metabolic Panel; Future  -     Lipid Panel; Future  -     CBC (No Diff); Future    5. Prediabetes  -     TSH; Future    Other orders  -     neomycin-polymyxin-hydrocortisone (CORTISPORIN) 1 % solution otic solution; Administer 4 drops into both ears Every 8 (Eight) Hours.  Dispense: 10 mL; Refill: 1        An After Visit Summary was printed and given to the patient at discharge.  Return in about 1 year (around 11/16/2024) for Medicare Wellness.      Williams Curiel D.O. 11/16/2023   Electronically signed.  "

## 2024-01-08 DIAGNOSIS — Z12.11 SCREENING FOR COLORECTAL CANCER: Primary | ICD-10-CM

## 2024-01-08 DIAGNOSIS — Z12.12 SCREENING FOR COLORECTAL CANCER: Primary | ICD-10-CM

## 2024-01-23 ENCOUNTER — TELEMEDICINE (OUTPATIENT)
Dept: OBSTETRICS AND GYNECOLOGY | Facility: CLINIC | Age: 69
End: 2024-01-23
Payer: MEDICARE

## 2024-01-23 DIAGNOSIS — N95.1 MENOPAUSAL STATE: ICD-10-CM

## 2024-01-29 NOTE — PROGRESS NOTES
Patient seen via video visit.    Subjective   Kathe Brito is a 68 y.o. female  YOB: 1955      No chief complaint on file.      Patient here for refill of compounded hormones that includes testosterone.          The following portions of the patient's history were reviewed and updated as appropriate: allergies, current medications, past family history, past medical history, past social history, past surgical history, and problem list.    Allergies   Allergen Reactions    Fish-Derived Products Hives    Iodine Rash    Sunflower Oil Hives    Atorvastatin Myalgia    Sulfa Antibiotics Hives    Silicone Rash and Unknown - High Severity       Past Medical History:   Diagnosis Date    Acid reflux     aortic defect     Arthritis     Seasonal allergies        Family History   Problem Relation Age of Onset    Osteoporosis Mother     Stroke Mother     Dementia Mother     Heart disease Father     Stroke Maternal Grandmother     Heart disease Maternal Grandfather     Stroke Paternal Grandmother     Breast cancer Neg Hx     Ovarian cancer Neg Hx     Uterine cancer Neg Hx     Colon cancer Neg Hx     Melanoma Neg Hx        Social History     Socioeconomic History    Marital status:    Tobacco Use    Smoking status: Never     Passive exposure: Never    Smokeless tobacco: Never   Vaping Use    Vaping Use: Never used   Substance and Sexual Activity    Alcohol use: Yes     Comment: occasionally     Drug use: Never    Sexual activity: Yes     Partners: Male         Current Outpatient Medications:     NON FORMULARY, Estriol Vaginal Cream 3mg/ml Insert 1ml intravaginally QHS for 7 nights, then twice weekly., Disp: 30 g, Rfl: 11    NON FORMULARY, 50:50 Biest 0.5mg/ml Apply 1ml to skin once daily, Disp: 30 g, Rfl: 5    NON FORMULARY, Testosterone 2mg/ml cream Apply 1ml to external vaginal area once daily, Disp: 30 g, Rfl: 0    NON FORMULARY, Apply 1 application  topically to the appropriate area as directed Daily.  DHEA 10mg daily, Disp: 30 g, Rfl: 11    NON FORMULARY, Progesterone 20mg/ml  Apply 1 ml to skin once daily, Disp: 30 g, Rfl: 11    cetirizine (zyrTEC) 10 MG tablet, Take 1 tablet by mouth Daily., Disp: 90 tablet, Rfl: 3    diclofenac (VOLTAREN) 75 MG EC tablet, Take 1 tablet by mouth 2 (Two) Times a Day., Disp: 90 tablet, Rfl: 1    esomeprazole (nexIUM) 40 MG capsule, Take 1 capsule by mouth Every Morning Before Breakfast., Disp: 90 capsule, Rfl: 3    hydrOXYzine pamoate (VISTARIL) 25 MG capsule, Take 1 capsule by mouth 2 (Two) Times a Day., Disp: 90 capsule, Rfl: 1    neomycin-polymyxin-hydrocortisone (CORTISPORIN) 1 % solution otic solution, Administer 4 drops into both ears Every 8 (Eight) Hours., Disp: 10 mL, Rfl: 1    Oxytocin, Vitamin E, Apply 1 application  topically to the appropriate area as directed Daily., Disp: 30 g, Rfl: 11    Turmeric (QC TUMERIC COMPLEX PO), Take  by mouth., Disp: , Rfl:     No LMP recorded. Patient is postmenopausal.    Sexual History:         Could not be calculated    Past Surgical History:   Procedure Laterality Date     SECTION      TONSILLECTOMY         Review of Systems   Constitutional:  Negative for activity change, appetite change, chills, diaphoresis, fatigue, fever and unexpected weight change.   HENT:  Negative for congestion, dental problem, drooling, ear discharge, ear pain, facial swelling, hearing loss, mouth sores, nosebleeds, postnasal drip, rhinorrhea, sinus pressure, sinus pain, sneezing, sore throat, tinnitus, trouble swallowing and voice change.    Eyes:  Negative for photophobia, pain, discharge, redness, itching and visual disturbance.   Respiratory:  Negative for apnea, cough, choking, chest tightness, shortness of breath, wheezing and stridor.    Cardiovascular:  Negative for chest pain, palpitations and leg swelling.   Gastrointestinal:  Negative for abdominal distention, abdominal pain, anal bleeding, blood in stool, constipation, diarrhea, nausea,  rectal pain and vomiting.   Endocrine: Negative for cold intolerance, heat intolerance, polydipsia, polyphagia and polyuria.   Genitourinary:  Negative for decreased urine volume, difficulty urinating, dyspareunia, dysuria, enuresis, flank pain, frequency, genital sores, hematuria, menstrual problem, pelvic pain, urgency, vaginal bleeding, vaginal discharge and vaginal pain.   Musculoskeletal:  Negative for arthralgias, back pain, gait problem, joint swelling, myalgias, neck pain and neck stiffness.   Skin:  Negative for color change, pallor, rash and wound.   Allergic/Immunologic: Negative for environmental allergies, food allergies and immunocompromised state.   Neurological:  Negative for dizziness, tremors, seizures, syncope, facial asymmetry, speech difficulty, weakness, light-headedness, numbness and headaches.   Hematological:  Negative for adenopathy. Does not bruise/bleed easily.   Psychiatric/Behavioral:  Negative for agitation, behavioral problems, confusion, decreased concentration, dysphoric mood, hallucinations, self-injury, sleep disturbance and suicidal ideas. The patient is not nervous/anxious and is not hyperactive.        Objective   Physical Exam  Vitals reviewed: No physical exam.           There were no vitals filed for this visit.    Diagnoses and all orders for this visit:    1. Menopausal state  Comments:  Patient doing well on comounded hormones and wants to remain on them.  Jaime reviewed.  Refill sent to pharmacy   Orders:  -     NON FORMULARY; Estriol Vaginal Cream 3mg/ml  Insert 1ml intravaginally QHS for 7 nights, then twice weekly.  Dispense: 30 g; Refill: 11  -     NON FORMULARY; 50:50 Biest 0.5mg/ml  Apply 1ml to skin once daily  Dispense: 30 g; Refill: 5  -     NON FORMULARY; Testosterone 2mg/ml cream Apply 1ml to external vaginal area once daily  Dispense: 30 g; Refill: 0  -     NON FORMULARY; Apply 1 application  topically to the appropriate area as directed Daily. DHEA 10mg  daily  Dispense: 30 g; Refill: 11  -     NON FORMULARY; Progesterone 20mg/ml   Apply 1 ml to skin once daily  Dispense: 30 g; Refill: 11                        Non-Smoker    MyChart Instructions Given

## 2024-01-30 DIAGNOSIS — N95.1 MENOPAUSAL STATE: ICD-10-CM

## 2024-02-28 DIAGNOSIS — N95.1 MENOPAUSAL STATE: ICD-10-CM

## 2024-02-28 NOTE — TELEPHONE ENCOUNTER
Pt called requesting a refill of her Testosterone. She states she normally gets 3 months worth of refills however was only given a 30 day supply. Pt's next appt is scheduled for 04/16/24. Last seen by telehealth on 01/23/24. Ok to refill?

## 2024-02-29 ENCOUNTER — OFFICE VISIT (OUTPATIENT)
Dept: INTERNAL MEDICINE | Facility: CLINIC | Age: 69
End: 2024-02-29
Payer: MEDICARE

## 2024-02-29 ENCOUNTER — TELEPHONE (OUTPATIENT)
Dept: INTERNAL MEDICINE | Facility: CLINIC | Age: 69
End: 2024-02-29
Payer: MEDICARE

## 2024-02-29 ENCOUNTER — LAB (OUTPATIENT)
Dept: LAB | Facility: HOSPITAL | Age: 69
End: 2024-02-29
Payer: MEDICARE

## 2024-02-29 ENCOUNTER — TELEPHONE (OUTPATIENT)
Dept: OBSTETRICS AND GYNECOLOGY | Age: 69
End: 2024-02-29
Payer: MEDICARE

## 2024-02-29 VITALS
SYSTOLIC BLOOD PRESSURE: 121 MMHG | DIASTOLIC BLOOD PRESSURE: 73 MMHG | WEIGHT: 154 LBS | HEART RATE: 74 BPM | BODY MASS INDEX: 27.29 KG/M2 | RESPIRATION RATE: 16 BRPM | TEMPERATURE: 97.5 F | OXYGEN SATURATION: 97 % | HEIGHT: 63 IN

## 2024-02-29 DIAGNOSIS — R30.0 DYSURIA: Primary | ICD-10-CM

## 2024-02-29 DIAGNOSIS — R30.0 DYSURIA: ICD-10-CM

## 2024-02-29 DIAGNOSIS — N30.00 ACUTE CYSTITIS WITHOUT HEMATURIA: ICD-10-CM

## 2024-02-29 LAB
BACTERIA UR QL AUTO: ABNORMAL /HPF
BILIRUB UR QL STRIP: NEGATIVE
CLARITY UR: CLEAR
COLOR UR: YELLOW
GLUCOSE UR STRIP-MCNC: NEGATIVE MG/DL
HGB UR QL STRIP.AUTO: NEGATIVE
HYALINE CASTS UR QL AUTO: ABNORMAL /LPF
KETONES UR QL STRIP: NEGATIVE
LEUKOCYTE ESTERASE UR QL STRIP.AUTO: ABNORMAL
NITRITE UR QL STRIP: NEGATIVE
PH UR STRIP.AUTO: 6.5 [PH] (ref 5–8)
PROT UR QL STRIP: NEGATIVE
RBC # UR STRIP: ABNORMAL /HPF
REF LAB TEST METHOD: ABNORMAL
SP GR UR STRIP: 1.01 (ref 1–1.03)
SQUAMOUS #/AREA URNS HPF: ABNORMAL /HPF
UROBILINOGEN UR QL STRIP: ABNORMAL
WBC # UR STRIP: ABNORMAL /HPF
WBC CLUMPS # UR AUTO: ABNORMAL /HPF

## 2024-02-29 PROCEDURE — 87186 SC STD MICRODIL/AGAR DIL: CPT

## 2024-02-29 PROCEDURE — 87086 URINE CULTURE/COLONY COUNT: CPT

## 2024-02-29 PROCEDURE — 87088 URINE BACTERIA CULTURE: CPT

## 2024-02-29 PROCEDURE — 81001 URINALYSIS AUTO W/SCOPE: CPT

## 2024-02-29 RX ORDER — NITROFURANTOIN 25; 75 MG/1; MG/1
100 CAPSULE ORAL 2 TIMES DAILY
Qty: 10 CAPSULE | Refills: 0 | Status: SHIPPED | OUTPATIENT
Start: 2024-02-29 | End: 2024-03-05

## 2024-02-29 NOTE — PROGRESS NOTES
"Chief Complaint-   Difficulty Urinating (Burning sensation during urination/) and Urinary Frequency    Subjective        Kathe Brito presents to [facility] for evaluation of the above complaint.     History of Present Illness  Reports symptom onset yesterday.  Has had frequent UTIs in the past but none in the last several months.  Denies nausea, vomiting.  No chills or bodyaches.  Reports painful urination and frequency.  Has been increasing fluids.      Review of Systems  Constitutional: Negative for chills and fever.  Respiratory: Negative for cough and shortness of breath.    Cardiovascular: Negative for chest pain and palpitations.  Gastrointestinal: Negative for abdominal pain, constipation and nausea.      Objective   Vital Signs:  /73 (BP Location: Left arm, Patient Position: Sitting, Cuff Size: Adult) Comment (Cuff Size): automated  Pulse 74   Temp 97.5 °F (36.4 °C) (Temporal)   Resp 16   Ht 160 cm (63\")   Wt 69.9 kg (154 lb)   SpO2 97%   BMI 27.28 kg/m²   Estimated body mass index is 27.28 kg/m² as calculated from the following:    Height as of this encounter: 160 cm (63\").    Weight as of this encounter: 69.9 kg (154 lb).           Physical Exam  Vitals reviewed.   Constitutional:       General: She is not in acute distress.     Appearance: Normal appearance. She is not ill-appearing.   Cardiovascular:      Rate and Rhythm: Normal rate and regular rhythm.      Pulses: Normal pulses.      Heart sounds: Normal heart sounds. No murmur heard.     No friction rub. No gallop.   Pulmonary:      Effort: Pulmonary effort is normal. No respiratory distress.      Breath sounds: Normal breath sounds. No wheezing.   Abdominal:      Tenderness: There is no right CVA tenderness or left CVA tenderness.   Musculoskeletal:         General: Normal range of motion.      Cervical back: Normal range of motion and neck supple.   Skin:     General: Skin is warm and dry.      " Capillary Refill: Capillary refill takes less than 2 seconds.   Neurological:      General: No focal deficit present.      Mental Status: She is alert and oriented to person, place, and time.   Psychiatric:         Mood and Affect: Mood normal.         Behavior: Behavior normal.         Thought Content: Thought content normal.         Judgment: Judgment normal.        Result Review :                   Assessment and Plan   Diagnoses and all orders for this visit:    1. Dysuria (Primary)  -     Urinalysis With Culture If Indicated -; Future    2. Acute cystitis without hematuria    Will go ahead and treat for UTI.  Macrobid sent to pharmacy.  Continue to push fluids.  Will update patient once culture result is back.         Follow Up   Return if symptoms worsen or fail to improve.  Patient was given instructions and counseling regarding her condition or for health maintenance advice. Please see specific information pulled into the AVS if appropriate.       Answers submitted by the patient for this visit:  Primary Reason for Visit (Submitted on 2/29/2024)  What is the primary reason for your visit?: Painful Urination

## 2024-02-29 NOTE — TELEPHONE ENCOUNTER
Pt called and states she is having an allergic reaction  to the Macrobid. She states she is having hives. No other symptoms, I did ask if she was having any trouble breathing or anything. She is NOT and states her  is home with her in such case she would start having any other issues. She is asking for a different antibiotic to be called in states that she has taken cipro before and had no issues. Please call medication into [facility]

## 2024-02-29 NOTE — TELEPHONE ENCOUNTER
Let's hold the Macrobid and await the culture results that are in the lab before we make further decisions. We should have more information tomorrow on the culture to make further decisions.

## 2024-02-29 NOTE — ADDENDUM NOTE
Addended by: JUDY BELL on: 2/29/2024 08:50 AM     Modules accepted: Orders    
Addended by: RANJIT DÍAZ on: 2/29/2024 07:38 AM     Modules accepted: Orders    
PMI and heart sounds localize heart on left side of chest/Pulse with normal variation, frequency and intensity (amplitude & strength) with equal intensity on upper and lower extremities

## 2024-02-29 NOTE — TELEPHONE ENCOUNTER
Pt called stating she feels as if she has a UTI and is asking if she could just drop off a sample without being seen. Pt advised she could be seen at an urgent care facility or her PCP for cultures and treatment. Pt advised if she were to just drop off a sample here in the office, we would not be able to treat her until culture results were received. Pt opted to be evaluated at urgent care

## 2024-03-01 ENCOUNTER — TELEPHONE (OUTPATIENT)
Dept: INTERNAL MEDICINE | Facility: CLINIC | Age: 69
End: 2024-03-01
Payer: MEDICARE

## 2024-03-01 DIAGNOSIS — N30.00 ACUTE CYSTITIS WITHOUT HEMATURIA: ICD-10-CM

## 2024-03-01 DIAGNOSIS — N30.00 ACUTE CYSTITIS WITHOUT HEMATURIA: Primary | ICD-10-CM

## 2024-03-01 RX ORDER — CIPROFLOXACIN 500 MG/1
500 TABLET, FILM COATED ORAL 2 TIMES DAILY
Qty: 10 TABLET | Refills: 0 | Status: SHIPPED | OUTPATIENT
Start: 2024-03-01 | End: 2024-03-06

## 2024-03-01 RX ORDER — CIPROFLOXACIN 500 MG/1
500 TABLET, FILM COATED ORAL 2 TIMES DAILY
Qty: 10 TABLET | Refills: 0 | Status: SHIPPED | OUTPATIENT
Start: 2024-03-01 | End: 2024-03-01 | Stop reason: SDUPTHER

## 2024-03-01 NOTE — TELEPHONE ENCOUNTER
PATIENT REQUESTING WE CANCEL THE PRESCRIPTION FOR      ciprofloxacin (Cipro) 500 MG tablet     SENT  TO     [facility] - [place], [place] - [address] - [phone] [facility] [phone] FX     AND RESEND TO       [id number] [place], [place] - [address] AT [address] - [phone] [facility] [phone] FX     DOES NOT WANT TO WAIT ON HER MAIL IN PHARMACY     PLEASE CALL ONCE RESENT   [phone]

## 2024-03-01 NOTE — TELEPHONE ENCOUNTER
Patient called she states that she seen the results on my chart for the Culture and was wanting to get a mew antibiotic. She had an allergic reaction to the Macrobid, see note from yesterday.

## 2024-03-01 NOTE — TELEPHONE ENCOUNTER
Rx Refill Note  Requested Prescriptions     Pending Prescriptions Disp Refills    ciprofloxacin (Cipro) 500 MG tablet 10 tablet 0     Sig: Take 1 tablet by mouth 2 (Two) Times a Day for 5 days.      Last office visit with prescribing clinician: 11/16/2023   Last telemedicine visit with prescribing clinician: Visit date not found   Next office visit with prescribing clinician: 11/18/2024                         Would you like a call back once the refill request has been completed: [] Yes [] No    If the office needs to give you a call back, can they leave a voicemail: [] Yes [] No    Jose Antonio Lee MA  03/01/24, 14:13 CST      PATIENT IS NEEDING CIPRO SENT TO [facility] IN [place]. INSTEAD OF MAIL ORDER.

## 2024-03-02 LAB — BACTERIA SPEC AEROBE CULT: ABNORMAL

## 2024-04-16 ENCOUNTER — TELEMEDICINE (OUTPATIENT)
Dept: OBSTETRICS AND GYNECOLOGY | Age: 69
End: 2024-04-16
Payer: MEDICARE

## 2024-04-16 DIAGNOSIS — N95.1 MENOPAUSAL STATE: ICD-10-CM

## 2024-04-16 PROCEDURE — 99213 OFFICE O/P EST LOW 20 MIN: CPT | Performed by: NURSE PRACTITIONER

## 2024-04-16 PROCEDURE — 1160F RVW MEDS BY RX/DR IN RCRD: CPT | Performed by: NURSE PRACTITIONER

## 2024-04-16 PROCEDURE — 1159F MED LIST DOCD IN RCRD: CPT | Performed by: NURSE PRACTITIONER

## 2024-04-16 NOTE — PROGRESS NOTES
Patient seen via video visit from her home.     Subjective   Kathe Brito is a 68 y.o. female  YOB: 1955      No chief complaint on file.      Patient here for refill of compounded hormones.         The following portions of the patient's history were reviewed and updated as appropriate: allergies, current medications, past family history, past medical history, past social history, past surgical history, and problem list.    Allergies   Allergen Reactions    Fish-Derived Products Hives    Iodine Rash    Sunflower Oil Hives    Atorvastatin Myalgia    Sulfa Antibiotics Hives    Silicone Rash and Unknown - High Severity       Past Medical History:   Diagnosis Date    Acid reflux     aortic defect     Arthritis     Seasonal allergies        Family History   Problem Relation Age of Onset    Osteoporosis Mother     Stroke Mother     Dementia Mother     Heart disease Father     Stroke Maternal Grandmother     Heart disease Maternal Grandfather     Stroke Paternal Grandmother     Breast cancer Neg Hx     Ovarian cancer Neg Hx     Uterine cancer Neg Hx     Colon cancer Neg Hx     Melanoma Neg Hx        Social History     Socioeconomic History    Marital status:    Tobacco Use    Smoking status: Never     Passive exposure: Never    Smokeless tobacco: Never   Vaping Use    Vaping status: Never Used   Substance and Sexual Activity    Alcohol use: Yes     Comment: occasionally     Drug use: Never    Sexual activity: Yes     Partners: Male         Current Outpatient Medications:     Testosterone 2 mg, Testosterone 2mg/ml cream Apply 1ml to external vaginal area once daily, Disp: 30 g, Rfl: 0    Bi-Est 50:50 0.5 mg, 50:50 Biest 0.5mg/ml  Apply 1ml to skin once daily, Disp: 30 g, Rfl: 5    cetirizine (zyrTEC) 10 MG tablet, Take 1 tablet by mouth Daily., Disp: 90 tablet, Rfl: 3    DHEA 10 mg, Apply 1 application  topically to the appropriate area as directed Daily. DHEA 10mg daily, Disp: 30 g, Rfl: 11     diclofenac (VOLTAREN) 75 MG EC tablet, Take 1 tablet by mouth 2 (Two) Times a Day., Disp: 90 tablet, Rfl: 1    esomeprazole (nexIUM) 40 MG capsule, Take 1 capsule by mouth Every Morning Before Breakfast., Disp: 90 capsule, Rfl: 3    Estriol 3 mg, Estriol Vaginal Cream 3mg/ml. Insert 1ml intravaginally QHS for 7 nights, then twice weekly., Disp: 30 g, Rfl: 11    hydrOXYzine pamoate (VISTARIL) 25 MG capsule, Take 1 capsule by mouth 2 (Two) Times a Day., Disp: 90 capsule, Rfl: 1    neomycin-polymyxin-hydrocortisone (CORTISPORIN) 1 % solution otic solution, Administer 4 drops into both ears Every 8 (Eight) Hours., Disp: 10 mL, Rfl: 1    Oxytocin, Vitamin E, Apply 1 application  topically to the appropriate area as directed Daily., Disp: 30 g, Rfl: 11    Progesterone 20 mg, Progesterone 20mg/ml  Apply 1 ml to skin once daily, Disp: 30 g, Rfl: 11    Turmeric (QC TUMERIC COMPLEX PO), Take  by mouth Daily., Disp: , Rfl:     No LMP recorded. Patient is postmenopausal.    Sexual History:         Could not be calculated    Past Surgical History:   Procedure Laterality Date     SECTION      TONSILLECTOMY         Review of Systems   Constitutional:  Negative for activity change, appetite change, chills, diaphoresis, fatigue, fever and unexpected weight change.   HENT:  Negative for congestion, dental problem, drooling, ear discharge, ear pain, facial swelling, hearing loss, mouth sores, nosebleeds, postnasal drip, rhinorrhea, sinus pressure, sinus pain, sneezing, sore throat, tinnitus, trouble swallowing and voice change.    Eyes:  Negative for photophobia, pain, discharge, redness, itching and visual disturbance.   Respiratory:  Negative for apnea, cough, choking, chest tightness, shortness of breath, wheezing and stridor.    Cardiovascular:  Negative for chest pain, palpitations and leg swelling.   Gastrointestinal:  Negative for abdominal distention, abdominal pain, anal bleeding, blood in stool, constipation,  diarrhea, nausea, rectal pain and vomiting.   Endocrine: Negative for cold intolerance, heat intolerance, polydipsia, polyphagia and polyuria.   Genitourinary:  Negative for decreased urine volume, difficulty urinating, dyspareunia, dysuria, enuresis, flank pain, frequency, genital sores, hematuria, menstrual problem, pelvic pain, urgency, vaginal bleeding, vaginal discharge and vaginal pain.   Musculoskeletal:  Negative for arthralgias, back pain, gait problem, joint swelling, myalgias, neck pain and neck stiffness.   Skin:  Negative for color change, pallor, rash and wound.   Allergic/Immunologic: Negative for environmental allergies, food allergies and immunocompromised state.   Neurological:  Negative for dizziness, tremors, seizures, syncope, facial asymmetry, speech difficulty, weakness, light-headedness, numbness and headaches.   Hematological:  Negative for adenopathy. Does not bruise/bleed easily.   Psychiatric/Behavioral:  Negative for agitation, behavioral problems, confusion, decreased concentration, dysphoric mood, hallucinations, self-injury, sleep disturbance and suicidal ideas. The patient is not nervous/anxious and is not hyperactive.        Objective   Physical Exam  Vitals reviewed: No physical exam.           There were no vitals filed for this visit.    Diagnoses and all orders for this visit:    1. Menopausal state  Comments:  Patient doing well on comounded hormones and wants to remain on them.  Jaime reviewed.  Refill sent to pharmacy   Orders:  -     Testosterone 2 mg; Testosterone 2mg/ml cream Apply 1ml to external vaginal area once daily  Dispense: 30 g; Refill: 0                        Non-Smoker    MyChart Instructions Given

## 2024-04-17 ENCOUNTER — TELEPHONE (OUTPATIENT)
Dept: OBSTETRICS AND GYNECOLOGY | Age: 69
End: 2024-04-17
Payer: MEDICARE

## 2024-04-17 NOTE — TELEPHONE ENCOUNTER
Pt had a video visit with Candy yesterday. Pt states her pharmacy had received the script for a refill of her compound however she was under the impression that an injection medication was going to be sent to [facility] in [place] for her to  and they advised they do not have anything for her. Please advise

## 2024-04-18 NOTE — TELEPHONE ENCOUNTER
I'm not for certain and pt did not know the name either. She just stated to me that it was a new injection medication that you all had talked about during her telehealth. I reached out to her and she states it was an injection to put in her stomach to help with having an orgasm

## 2024-04-23 DIAGNOSIS — R68.82 LOW LIBIDO: Primary | ICD-10-CM

## 2024-04-23 RX ORDER — BREMELANOTIDE 1.75 MG/.3ML
1.75 INJECTION SUBCUTANEOUS AS NEEDED
Qty: 2.4 ML | Refills: 0 | Status: SHIPPED | OUTPATIENT
Start: 2024-04-23

## 2024-04-23 NOTE — TELEPHONE ENCOUNTER
Pt called in again to inquire about medication. In speaking with Candy on Friday, the pt needs a script for Peter. Since Candy is out of the office, is this something you could send in for her?

## 2024-04-24 NOTE — TELEPHONE ENCOUNTER
This is something that can wait until Candy is back in the office.  She knows what kind of conversation they had at the patient's last visit.

## 2024-05-08 ENCOUNTER — OFFICE VISIT (OUTPATIENT)
Dept: OBSTETRICS AND GYNECOLOGY | Age: 69
End: 2024-05-08
Payer: MEDICARE

## 2024-05-08 VITALS
WEIGHT: 154 LBS | SYSTOLIC BLOOD PRESSURE: 102 MMHG | HEIGHT: 63 IN | BODY MASS INDEX: 27.29 KG/M2 | DIASTOLIC BLOOD PRESSURE: 60 MMHG | RESPIRATION RATE: 18 BRPM

## 2024-05-08 DIAGNOSIS — M85.88 OTHER SPECIFIED DISORDERS OF BONE DENSITY AND STRUCTURE, OTHER SITE: ICD-10-CM

## 2024-05-08 DIAGNOSIS — N95.1 MENOPAUSAL STATE: ICD-10-CM

## 2024-05-08 DIAGNOSIS — Z12.31 ENCOUNTER FOR SCREENING MAMMOGRAM FOR MALIGNANT NEOPLASM OF BREAST: ICD-10-CM

## 2024-05-08 DIAGNOSIS — Z01.419 WELL WOMAN EXAM WITH ROUTINE GYNECOLOGICAL EXAM: Primary | ICD-10-CM

## 2024-05-08 DIAGNOSIS — R68.82 LOW LIBIDO: ICD-10-CM

## 2024-05-08 RX ORDER — BREMELANOTIDE 1.75 MG/.3ML
1.75 INJECTION SUBCUTANEOUS AS NEEDED
Qty: 2.4 ML | Refills: 0 | Status: SHIPPED | OUTPATIENT
Start: 2024-05-08

## 2024-05-10 ENCOUNTER — TELEPHONE (OUTPATIENT)
Dept: OBSTETRICS AND GYNECOLOGY | Age: 69
End: 2024-05-10
Payer: MEDICARE

## 2024-06-25 DIAGNOSIS — N95.1 MENOPAUSAL STATE: ICD-10-CM

## 2024-07-13 DIAGNOSIS — K21.9 GASTROESOPHAGEAL REFLUX DISEASE WITHOUT ESOPHAGITIS: ICD-10-CM

## 2024-07-15 RX ORDER — ESOMEPRAZOLE MAGNESIUM 40 MG/1
40 CAPSULE, DELAYED RELEASE ORAL
Qty: 90 CAPSULE | Refills: 3 | Status: SHIPPED | OUTPATIENT
Start: 2024-07-15

## 2024-08-06 ENCOUNTER — TELEMEDICINE (OUTPATIENT)
Dept: OBSTETRICS AND GYNECOLOGY | Age: 69
End: 2024-08-06
Payer: MEDICARE

## 2024-08-06 DIAGNOSIS — N95.1 MENOPAUSAL STATE: Primary | ICD-10-CM

## 2024-08-06 DIAGNOSIS — R68.82 LOW LIBIDO: ICD-10-CM

## 2024-08-06 PROCEDURE — 99213 OFFICE O/P EST LOW 20 MIN: CPT | Performed by: NURSE PRACTITIONER

## 2024-08-06 NOTE — PROGRESS NOTES
Seen via video visit from her home/provider in office.    Subjective   Kathe Brito is a 68 y.o. female  YOB: 1955      No chief complaint on file.      Patient here for refill of compounded testosterone.        The following portions of the patient's history were reviewed and updated as appropriate: allergies, current medications, past family history, past medical history, past social history, past surgical history, and problem list.    Allergies   Allergen Reactions    Fish-Derived Products Hives    Iodine Rash    Sunflower Oil Hives    Atorvastatin Myalgia    Sulfa Antibiotics Hives    Silicone Rash and Unknown - High Severity       Past Medical History:   Diagnosis Date    Acid reflux     aortic defect     Arthritis     Seasonal allergies        Family History   Problem Relation Age of Onset    Osteoporosis Mother     Stroke Mother     Dementia Mother     Heart disease Father     Stroke Maternal Grandmother     Heart disease Maternal Grandfather     Stroke Paternal Grandmother     Breast cancer Neg Hx     Ovarian cancer Neg Hx     Uterine cancer Neg Hx     Colon cancer Neg Hx     Melanoma Neg Hx        Social History     Socioeconomic History    Marital status:    Tobacco Use    Smoking status: Never     Passive exposure: Never    Smokeless tobacco: Never   Vaping Use    Vaping status: Never Used   Substance and Sexual Activity    Alcohol use: Yes     Comment: occasionally     Drug use: Never    Sexual activity: Yes     Partners: Male         Current Outpatient Medications:     NON FORMULARY, Testosterone 2.5 mg/ml cream Apply 1ml to external vaginal area once daily, Disp: 30 g, Rfl: 0    Bi-Est 50:50 0.5 mg, 50:50 Biest 0.5mg/ml  Apply 1ml to skin once daily, Disp: 30 g, Rfl: 5    Bremelanotide Acetate (Vyleesi) 1.75 MG/0.3ML solution auto-injector, Inject 1.75 mg under the skin into the appropriate area as directed As Needed (libido)., Disp: 2.4 mL, Rfl: 0    cetirizine (zyrTEC) 10 MG  tablet, Take 1 tablet by mouth Daily., Disp: 90 tablet, Rfl: 3    DHEA 10 mg, Apply 1 application  topically to the appropriate area as directed Daily. DHEA 10mg daily, Disp: 30 g, Rfl: 11    diclofenac (VOLTAREN) 75 MG EC tablet, Take 1 tablet by mouth 2 (Two) Times a Day., Disp: 90 tablet, Rfl: 1    esomeprazole (nexIUM) 40 MG capsule, Take 1 capsule by mouth Every Morning Before Breakfast., Disp: 90 capsule, Rfl: 3    Estriol 3 mg, Estriol Vaginal Cream 3mg/ml. Insert 1ml intravaginally QHS for 7 nights, then twice weekly., Disp: 30 g, Rfl: 11    hydrOXYzine pamoate (VISTARIL) 25 MG capsule, Take 1 capsule by mouth 2 (Two) Times a Day., Disp: 90 capsule, Rfl: 1    [START ON 2024] NON FORMULARY, Testosterone 2.5 mg/ml cream Apply 1ml to external vaginal area once daily, Disp: 30 g, Rfl: 0    [START ON 10/5/2024] NON FORMULARY, Testosterone 2.5 mg/ml cream Apply 1ml to external vaginal area once daily, Disp: 30 g, Rfl: 0    Oxytocin, Vitamin E, Apply 1 application  topically to the appropriate area as directed Daily., Disp: 30 g, Rfl: 11    Progesterone 20 mg, Progesterone 20mg/ml  Apply 1 ml to skin once daily, Disp: 30 g, Rfl: 11    Turmeric (QC TUMERIC COMPLEX PO), Take  by mouth Daily., Disp: , Rfl:     No LMP recorded. Patient is postmenopausal.    Sexual History:         Could not be calculated    Past Surgical History:   Procedure Laterality Date     SECTION      TONSILLECTOMY         Review of Systems   Constitutional:  Negative for activity change, appetite change, chills, diaphoresis, fatigue, fever and unexpected weight change.   HENT:  Negative for congestion, dental problem, drooling, ear discharge, ear pain, facial swelling, hearing loss, mouth sores, nosebleeds, postnasal drip, rhinorrhea, sinus pressure, sinus pain, sneezing, sore throat, tinnitus, trouble swallowing and voice change.    Eyes:  Negative for photophobia, pain, discharge, redness, itching and visual disturbance.    Respiratory:  Negative for apnea, cough, choking, chest tightness, shortness of breath, wheezing and stridor.    Cardiovascular:  Negative for chest pain, palpitations and leg swelling.   Gastrointestinal:  Negative for abdominal distention, abdominal pain, anal bleeding, blood in stool, constipation, diarrhea, nausea, rectal pain and vomiting.   Endocrine: Negative for cold intolerance, heat intolerance, polydipsia, polyphagia and polyuria.   Genitourinary:  Negative for decreased urine volume, difficulty urinating, dyspareunia, dysuria, enuresis, flank pain, frequency, genital sores, hematuria, menstrual problem, pelvic pain, urgency, vaginal bleeding, vaginal discharge and vaginal pain.   Musculoskeletal:  Negative for arthralgias, back pain, gait problem, joint swelling, myalgias, neck pain and neck stiffness.   Skin:  Negative for color change, pallor, rash and wound.   Allergic/Immunologic: Negative for environmental allergies, food allergies and immunocompromised state.   Neurological:  Negative for dizziness, tremors, seizures, syncope, facial asymmetry, speech difficulty, weakness, light-headedness, numbness and headaches.   Hematological:  Negative for adenopathy. Does not bruise/bleed easily.   Psychiatric/Behavioral:  Negative for agitation, behavioral problems, confusion, decreased concentration, dysphoric mood, hallucinations, self-injury, sleep disturbance and suicidal ideas. The patient is not nervous/anxious and is not hyperactive.        Objective   Physical Exam  Vitals reviewed: No physical exam.           There were no vitals filed for this visit.    Diagnoses and all orders for this visit:    1. Menopausal state (Primary)  Comments:  Patient doing well on comounded testosterone and wants to remain on it.  Jaime reviewed.  Refill sent to pharmacy  Orders:  -     NON FORMULARY; Testosterone 2.5 mg/ml cream Apply 1ml to external vaginal area once daily  Dispense: 30 g; Refill: 0  -     NON  FORMULARY; Testosterone 2.5 mg/ml cream Apply 1ml to external vaginal area once daily  Dispense: 30 g; Refill: 0  -     NON FORMULARY; Testosterone 2.5 mg/ml cream Apply 1ml to external vaginal area once daily  Dispense: 30 g; Refill: 0    2. Low libido  Comments:  Patient is on compounded testosterone 2.5 mg.  States there may be slight improvement with libido.  To test testosterone level-follow-up pending results.  Orders:  -     Testosterone                        Non-Smoker    MyChart Instructions Given

## 2024-08-07 LAB — TESTOST SERPL-MCNC: 20 NG/DL (ref 3–67)

## 2024-08-12 ENCOUNTER — TELEPHONE (OUTPATIENT)
Dept: INTERNAL MEDICINE | Facility: CLINIC | Age: 69
End: 2024-08-12
Payer: MEDICARE

## 2024-08-12 DIAGNOSIS — K21.9 GASTROESOPHAGEAL REFLUX DISEASE WITHOUT ESOPHAGITIS: ICD-10-CM

## 2024-08-12 RX ORDER — ESOMEPRAZOLE MAGNESIUM 40 MG/1
40 CAPSULE, DELAYED RELEASE ORAL
Qty: 90 CAPSULE | Refills: 3 | Status: SHIPPED | OUTPATIENT
Start: 2024-08-12

## 2024-08-12 NOTE — TELEPHONE ENCOUNTER
Caller: Kathe Brito    Relationship: Self    Best call back number: [phone]     Which medication are you concerned about: esomeprazole (nexIUM) 40 MG capsule     What are your concerns: PATIENT STATES THE [facility] IS UNABLE TO REFILL THIS PRESCRIPTION. SHE IS REQUESTING A CALLBACK REGARDING HER OPTIONS.

## 2024-08-13 DIAGNOSIS — N95.1 MENOPAUSAL STATE: ICD-10-CM

## 2024-09-12 DIAGNOSIS — M79.671 BILATERAL FOOT PAIN: ICD-10-CM

## 2024-09-12 DIAGNOSIS — F51.01 PRIMARY INSOMNIA: ICD-10-CM

## 2024-09-12 DIAGNOSIS — M79.672 BILATERAL FOOT PAIN: ICD-10-CM

## 2024-09-12 RX ORDER — HYDROXYZINE PAMOATE 25 MG/1
25 CAPSULE ORAL 2 TIMES DAILY
Qty: 90 CAPSULE | Refills: 1 | Status: SHIPPED | OUTPATIENT
Start: 2024-09-12

## 2024-09-12 RX ORDER — DICLOFENAC SODIUM 75 MG/1
75 TABLET, DELAYED RELEASE ORAL 2 TIMES DAILY
Qty: 90 TABLET | Refills: 1 | Status: SHIPPED | OUTPATIENT
Start: 2024-09-12

## 2024-09-12 NOTE — TELEPHONE ENCOUNTER
Caller: BritoKathe    Relationship: Self    Best call back number: [phone]     Requested Prescriptions:   Requested Prescriptions     Pending Prescriptions Disp Refills    hydrOXYzine pamoate (VISTARIL) 25 MG capsule 90 capsule 1     Sig: Take 1 capsule by mouth 2 (Two) Times a Day.    diclofenac (VOLTAREN) 75 MG EC tablet 90 tablet 1     Sig: Take 1 tablet by mouth 2 (Two) Times a Day.        [id number] [address] - [phone] [facility] [phone] [facility]     Last office visit with prescribing clinician: 11/16/2023   Last telemedicine visit with prescribing clinician: Visit date not found   Next office visit with prescribing clinician: 11/18/2024     Additional details provided by patient:     Does the patient have less than a 3 day supply:  [] Yes  [x] No    Would you like a call back once the refill request has been completed: [] Yes [] No    If the office needs to give you a call back, can they leave a voicemail: [] Yes [] No    Yazan Lindsay Rep   09/12/24 08:04 CDT

## 2024-10-31 ENCOUNTER — TELEPHONE (OUTPATIENT)
Dept: OBSTETRICS AND GYNECOLOGY | Age: 69
End: 2024-10-31

## 2024-10-31 NOTE — TELEPHONE ENCOUNTER
Caller: Kathe Brito    Relationship: Self    Best call back number: [phone]    What is the best time to reach you: ANY    Who are you requesting to speak with (clinical staff, provider,  specific staff member):        What was the call regarding: PT STATES SHE THOUGHT HER APPT 11/6 WAS SUPPOSED TO BE A VIDEO VISIT; PER HER MYCHART IT IS NOT.   PT WOULD ALSO LIKE TO DISCUSS HAVING HORMONE BLOODWORK DONE AGAIN PRIOR TO HER APPT TO REVIEW AT APPT  REQUESTING CALL BACK TO DISCUSS

## 2024-11-06 ENCOUNTER — OFFICE VISIT (OUTPATIENT)
Dept: OBSTETRICS AND GYNECOLOGY | Age: 69
End: 2024-11-06
Payer: MEDICARE

## 2024-11-06 VITALS
DIASTOLIC BLOOD PRESSURE: 62 MMHG | BODY MASS INDEX: 26.58 KG/M2 | SYSTOLIC BLOOD PRESSURE: 106 MMHG | WEIGHT: 150 LBS | HEIGHT: 63 IN

## 2024-11-06 DIAGNOSIS — N95.1 MENOPAUSAL STATE: Primary | ICD-10-CM

## 2024-11-06 DIAGNOSIS — R79.89 LOW TESTOSTERONE LEVEL IN FEMALE: ICD-10-CM

## 2024-11-07 LAB — TESTOST SERPL-MCNC: 26 NG/DL (ref 3–67)

## 2024-11-11 ENCOUNTER — TELEPHONE (OUTPATIENT)
Dept: OBSTETRICS AND GYNECOLOGY | Age: 69
End: 2024-11-11
Payer: MEDICARE

## 2024-11-11 NOTE — TELEPHONE ENCOUNTER
Patient called stating that her Compounds were called in to [facility] and she needs them for [facility].  I am not seeing where they were called and pended refill request is showing Monse Vargas.   Pt was seen on 11/6/24

## 2024-11-13 DIAGNOSIS — N95.1 MENOPAUSAL STATE: ICD-10-CM

## 2024-11-18 NOTE — PROGRESS NOTES
Subjective   Kathe Brito is a 68 y.o. female  YOB: 1955      Chief Complaint   Patient presents with    Follow-up     Patient presents today for follow up on meds, compounded hormones.          Follow-upPertinent negatives include no chest pain, no confusion, no dizziness, no nausea, no palpitations, no shortness of breath, no fatigue, no polydipsia, no polyphagia, no polyuria, no weakness, no headaches, no neck pain, no myalgias, no wheezing, no abdominal pain, no choking, no cough, no sore throat, is not nervous/anxious, no cold intolerance, no constipation, no diaphoresis, no diarrhea, no heat intolerance, no menstrual problem, no tremors and no trouble swallowing.       The following portions of the patient's history were reviewed and updated as appropriate: allergies, current medications, past family history, past medical history, past social history, past surgical history, and problem list.    Allergies   Allergen Reactions    Fish-Derived Products Hives    Iodine Rash    Sunflower Oil Hives    Atorvastatin Myalgia    Nitrofurantoin Monohyd Macro Hives    Sulfa Antibiotics Hives    Silicone Rash and Unknown - High Severity       Past Medical History:   Diagnosis Date    Acid reflux     aortic defect     Arthritis     Seasonal allergies        Family History   Problem Relation Age of Onset    Osteoporosis Mother     Stroke Mother     Dementia Mother     Heart disease Father     Stroke Maternal Grandmother     Heart disease Maternal Grandfather     Stroke Paternal Grandmother     Breast cancer Neg Hx     Ovarian cancer Neg Hx     Uterine cancer Neg Hx     Colon cancer Neg Hx     Melanoma Neg Hx        Social History     Socioeconomic History    Marital status:    Tobacco Use    Smoking status: Never     Passive exposure: Never    Smokeless tobacco: Never   Vaping Use    Vaping status: Never Used   Substance and Sexual Activity    Alcohol use: Yes     Comment: occasionally     Drug  use: Never    Sexual activity: Yes     Partners: Male         Current Outpatient Medications:     Bi-Est 50:50 0.5 mg, 50:50 Biest 0.5mg/ml  Apply 1ml to skin once daily, Disp: 30 g, Rfl: 5    cetirizine (zyrTEC) 10 MG tablet, Take 1 tablet by mouth Daily., Disp: 90 tablet, Rfl: 3    DHEA 10 mg, Apply 1 application  topically to the appropriate area as directed Daily. DHEA 10mg daily, Disp: 30 g, Rfl: 11    diclofenac (VOLTAREN) 75 MG EC tablet, Take 1 tablet by mouth 2 (Two) Times a Day., Disp: 90 tablet, Rfl: 1    esomeprazole (nexIUM) 40 MG capsule, Take 1 capsule by mouth Every Morning Before Breakfast., Disp: 90 capsule, Rfl: 3    Estriol 3 mg, Estriol Vaginal Cream 3mg/ml. Insert 1ml intravaginally QHS for 7 nights, then twice weekly., Disp: 30 g, Rfl: 11    hydrOXYzine pamoate (VISTARIL) 25 MG capsule, Take 1 capsule by mouth 2 (Two) Times a Day., Disp: 90 capsule, Rfl: 1    neomycin-polymyxin-hydrocortisone (CORTISPORIN) 3.5-06583-4 otic solution, Administer 3 drops into both ears 4 (Four) Times a Day., Disp: 10 mL, Rfl: 0    NON FORMULARY, Testosterone 2.5 mg/ml cream Apply 1ml to external vaginal area once daily, Disp: 30 g, Rfl: 0    [START ON 2024] NON FORMULARY, Testosterone 2.5 mg/ml cream Apply 1ml to external vaginal area once daily, Disp: 30 g, Rfl: 0    [START ON 2025] NON FORMULARY, Testosterone 2.5 mg/ml cream Apply 1ml to external vaginal area once daily, Disp: 30 g, Rfl: 0    Oxytocin, Vitamin E, Apply 1 application  topically to the appropriate area as directed Daily., Disp: 30 g, Rfl: 11    Progesterone 20 mg, Progesterone 20mg/ml  Apply 1 ml to skin once daily, Disp: 30 g, Rfl: 11    Turmeric (QC TUMERIC COMPLEX PO), Take  by mouth Daily., Disp: , Rfl:     No LMP recorded. Patient is postmenopausal.    Sexual History:         Could not be calculated    Past Surgical History:   Procedure Laterality Date     SECTION      TONSILLECTOMY         Review of Systems    Constitutional:  Negative for activity change, appetite change, chills, diaphoresis, fatigue, fever and unexpected weight change.   HENT:  Negative for congestion, dental problem, drooling, ear discharge, ear pain, facial swelling, hearing loss, mouth sores, nosebleeds, postnasal drip, rhinorrhea, sinus pressure, sinus pain, sneezing, sore throat, tinnitus, trouble swallowing and voice change.    Eyes:  Negative for photophobia, pain, discharge, redness, itching and visual disturbance.   Respiratory:  Negative for apnea, cough, choking, chest tightness, shortness of breath, wheezing and stridor.    Cardiovascular:  Negative for chest pain, palpitations and leg swelling.   Gastrointestinal:  Negative for abdominal distention, abdominal pain, anal bleeding, blood in stool, constipation, diarrhea, nausea, rectal pain and vomiting.   Endocrine: Negative for cold intolerance, heat intolerance, polydipsia, polyphagia and polyuria.   Genitourinary:  Negative for decreased urine volume, difficulty urinating, dyspareunia, dysuria, enuresis, flank pain, frequency, genital sores, hematuria, menstrual problem, pelvic pain, urgency, vaginal bleeding, vaginal discharge and vaginal pain.   Musculoskeletal:  Negative for arthralgias, back pain, gait problem, joint swelling, myalgias, neck pain and neck stiffness.   Skin:  Negative for color change, pallor, rash and wound.   Allergic/Immunologic: Negative for environmental allergies, food allergies and immunocompromised state.   Neurological:  Negative for dizziness, tremors, seizures, syncope, facial asymmetry, speech difficulty, weakness, light-headedness, numbness and headaches.   Hematological:  Negative for adenopathy. Does not bruise/bleed easily.   Psychiatric/Behavioral:  Negative for agitation, behavioral problems, confusion, decreased concentration, dysphoric mood, hallucinations, self-injury, sleep disturbance and suicidal ideas. The patient is not nervous/anxious and  "is not hyperactive.        Objective   Physical Exam  Vitals and nursing note reviewed.   Constitutional:       Appearance: She is well-developed.   HENT:      Head: Normocephalic.   Eyes:      Pupils: Pupils are equal, round, and reactive to light.   Cardiovascular:      Rate and Rhythm: Normal rate and regular rhythm.   Pulmonary:      Effort: Pulmonary effort is normal.      Breath sounds: Normal breath sounds.   Abdominal:      Palpations: Abdomen is soft.   Musculoskeletal:         General: Normal range of motion.      Cervical back: Normal range of motion.   Skin:     General: Skin is warm and dry.   Neurological:      Mental Status: She is alert and oriented to person, place, and time.   Psychiatric:         Behavior: Behavior normal.           Vitals:    11/06/24 1041   BP: 106/62   Weight: 68 kg (150 lb)   Height: 158.8 cm (62.5\")       Diagnoses and all orders for this visit:    1. Menopausal state (Primary)  Comments:  Patient doing well on comounded testosterone and wants to remain on it.  Jaime reviewed.  Refill sent to pharmacy  Orders:  -     NON FORMULARY; Testosterone 2.5 mg/ml cream Apply 1ml to external vaginal area once daily  Dispense: 30 g; Refill: 0  -     NON FORMULARY; Testosterone 2.5 mg/ml cream Apply 1ml to external vaginal area once daily  Dispense: 30 g; Refill: 0  -     NON FORMULARY; Testosterone 2.5 mg/ml cream Apply 1ml to external vaginal area once daily  Dispense: 30 g; Refill: 0    2. Low testosterone level in female  Comments:  To recheck testosterone level today - f/u pending results.  Orders:  -     Cancel: Testosterone - total; Future  -     Testosterone - total                        Non-Smoker    MyChart Instructions Given       "

## 2024-11-27 ENCOUNTER — LAB (OUTPATIENT)
Dept: LAB | Facility: HOSPITAL | Age: 69
End: 2024-11-27
Payer: MEDICARE

## 2024-11-27 ENCOUNTER — OFFICE VISIT (OUTPATIENT)
Dept: INTERNAL MEDICINE | Facility: CLINIC | Age: 69
End: 2024-11-27
Payer: MEDICARE

## 2024-11-27 VITALS
SYSTOLIC BLOOD PRESSURE: 129 MMHG | TEMPERATURE: 97 F | BODY MASS INDEX: 26.7 KG/M2 | RESPIRATION RATE: 16 BRPM | WEIGHT: 150.7 LBS | OXYGEN SATURATION: 100 % | HEIGHT: 63 IN | DIASTOLIC BLOOD PRESSURE: 78 MMHG | HEART RATE: 60 BPM

## 2024-11-27 DIAGNOSIS — K21.9 GASTROESOPHAGEAL REFLUX DISEASE WITHOUT ESOPHAGITIS: ICD-10-CM

## 2024-11-27 DIAGNOSIS — Z23 NEED FOR VACCINATION: ICD-10-CM

## 2024-11-27 DIAGNOSIS — M89.9 BONE DISORDER: ICD-10-CM

## 2024-11-27 DIAGNOSIS — E66.3 OVERWEIGHT (BMI 25.0-29.9): ICD-10-CM

## 2024-11-27 DIAGNOSIS — Z00.00 MEDICARE ANNUAL WELLNESS VISIT, SUBSEQUENT: Primary | ICD-10-CM

## 2024-11-27 DIAGNOSIS — Z00.00 MEDICARE ANNUAL WELLNESS VISIT, SUBSEQUENT: ICD-10-CM

## 2024-11-27 DIAGNOSIS — R73.03 PREDIABETES: ICD-10-CM

## 2024-11-27 DIAGNOSIS — Z76.0 MEDICATION REFILL: ICD-10-CM

## 2024-11-27 DIAGNOSIS — Z13.220 SCREENING FOR HYPERLIPIDEMIA: ICD-10-CM

## 2024-11-27 DIAGNOSIS — R53.83 OTHER FATIGUE: ICD-10-CM

## 2024-11-27 LAB
25(OH)D3 SERPL-MCNC: 37.3 NG/ML (ref 30–100)
ALBUMIN SERPL-MCNC: 4.4 G/DL (ref 3.5–5.2)
ALBUMIN/GLOB SERPL: 1.5 G/DL
ALP SERPL-CCNC: 72 U/L (ref 39–117)
ALT SERPL W P-5'-P-CCNC: 21 U/L (ref 1–33)
ANION GAP SERPL CALCULATED.3IONS-SCNC: 10 MMOL/L (ref 5–15)
AST SERPL-CCNC: 20 U/L (ref 1–32)
BILIRUB SERPL-MCNC: 0.4 MG/DL (ref 0–1.2)
BUN SERPL-MCNC: 18 MG/DL (ref 8–23)
BUN/CREAT SERPL: 26.5 (ref 7–25)
CALCIUM SPEC-SCNC: 9.1 MG/DL (ref 8.6–10.5)
CHLORIDE SERPL-SCNC: 103 MMOL/L (ref 98–107)
CHOLEST SERPL-MCNC: 237 MG/DL (ref 0–200)
CO2 SERPL-SCNC: 26 MMOL/L (ref 22–29)
CREAT SERPL-MCNC: 0.68 MG/DL (ref 0.57–1)
DEPRECATED RDW RBC AUTO: 48.3 FL (ref 37–54)
EGFRCR SERPLBLD CKD-EPI 2021: 95.6 ML/MIN/1.73
ERYTHROCYTE [DISTWIDTH] IN BLOOD BY AUTOMATED COUNT: 13.6 % (ref 12.3–15.4)
FOLATE SERPL-MCNC: 16.1 NG/ML (ref 4.78–24.2)
GLOBULIN UR ELPH-MCNC: 3 GM/DL
GLUCOSE SERPL-MCNC: 92 MG/DL (ref 65–99)
HBA1C MFR BLD: 5.6 % (ref 4.8–5.6)
HCT VFR BLD AUTO: 43.4 % (ref 34–46.6)
HDLC SERPL-MCNC: 55 MG/DL (ref 40–60)
HGB BLD-MCNC: 13.8 G/DL (ref 12–15.9)
LDLC SERPL CALC-MCNC: 159 MG/DL (ref 0–100)
LDLC/HDLC SERPL: 2.83 {RATIO}
MCH RBC QN AUTO: 30.3 PG (ref 26.6–33)
MCHC RBC AUTO-ENTMCNC: 31.8 G/DL (ref 31.5–35.7)
MCV RBC AUTO: 95.4 FL (ref 79–97)
PLATELET # BLD AUTO: 352 10*3/MM3 (ref 140–450)
PMV BLD AUTO: 9.1 FL (ref 6–12)
POTASSIUM SERPL-SCNC: 4.2 MMOL/L (ref 3.5–5.2)
PROT SERPL-MCNC: 7.4 G/DL (ref 6–8.5)
RBC # BLD AUTO: 4.55 10*6/MM3 (ref 3.77–5.28)
SODIUM SERPL-SCNC: 139 MMOL/L (ref 136–145)
TRIGL SERPL-MCNC: 131 MG/DL (ref 0–150)
TSH SERPL DL<=0.05 MIU/L-ACNC: 1.7 UIU/ML (ref 0.27–4.2)
VIT B12 BLD-MCNC: 668 PG/ML (ref 211–946)
VLDLC SERPL-MCNC: 23 MG/DL (ref 5–40)
WBC NRBC COR # BLD AUTO: 9.18 10*3/MM3 (ref 3.4–10.8)

## 2024-11-27 PROCEDURE — 99213 OFFICE O/P EST LOW 20 MIN: CPT | Performed by: NURSE PRACTITIONER

## 2024-11-27 PROCEDURE — 82306 VITAMIN D 25 HYDROXY: CPT

## 2024-11-27 PROCEDURE — 82607 VITAMIN B-12: CPT

## 2024-11-27 PROCEDURE — 80061 LIPID PANEL: CPT

## 2024-11-27 PROCEDURE — 90662 IIV NO PRSV INCREASED AG IM: CPT | Performed by: NURSE PRACTITIONER

## 2024-11-27 PROCEDURE — 1160F RVW MEDS BY RX/DR IN RCRD: CPT | Performed by: NURSE PRACTITIONER

## 2024-11-27 PROCEDURE — 83036 HEMOGLOBIN GLYCOSYLATED A1C: CPT

## 2024-11-27 PROCEDURE — 82746 ASSAY OF FOLIC ACID SERUM: CPT

## 2024-11-27 PROCEDURE — 84443 ASSAY THYROID STIM HORMONE: CPT

## 2024-11-27 PROCEDURE — G0008 ADMIN INFLUENZA VIRUS VAC: HCPCS | Performed by: NURSE PRACTITIONER

## 2024-11-27 PROCEDURE — 1159F MED LIST DOCD IN RCRD: CPT | Performed by: NURSE PRACTITIONER

## 2024-11-27 PROCEDURE — 80053 COMPREHEN METABOLIC PANEL: CPT

## 2024-11-27 PROCEDURE — G0439 PPPS, SUBSEQ VISIT: HCPCS | Performed by: NURSE PRACTITIONER

## 2024-11-27 PROCEDURE — 36415 COLL VENOUS BLD VENIPUNCTURE: CPT

## 2024-11-27 PROCEDURE — 85027 COMPLETE CBC AUTOMATED: CPT

## 2024-11-27 PROCEDURE — 1170F FXNL STATUS ASSESSED: CPT | Performed by: NURSE PRACTITIONER

## 2024-11-27 RX ORDER — CETIRIZINE HYDROCHLORIDE 10 MG/1
10 TABLET ORAL DAILY
Qty: 90 TABLET | Refills: 3 | Status: SHIPPED | OUTPATIENT
Start: 2024-11-27

## 2024-11-27 RX ORDER — ESOMEPRAZOLE MAGNESIUM 40 MG/1
40 CAPSULE, DELAYED RELEASE ORAL
Qty: 90 CAPSULE | Refills: 3 | Status: SHIPPED | OUTPATIENT
Start: 2024-11-27

## 2024-11-27 RX ORDER — DICLOFENAC SODIUM 75 MG/1
75 TABLET, DELAYED RELEASE ORAL 2 TIMES DAILY
Qty: 180 TABLET | Refills: 1 | Status: SHIPPED | OUTPATIENT
Start: 2024-11-27

## 2024-11-27 RX ORDER — HYDROXYZINE PAMOATE 25 MG/1
25 CAPSULE ORAL 2 TIMES DAILY
Qty: 180 CAPSULE | Refills: 1 | Status: SHIPPED | OUTPATIENT
Start: 2024-11-27

## 2024-11-27 RX ORDER — CETIRIZINE HYDROCHLORIDE 10 MG/1
10 TABLET ORAL DAILY
Qty: 90 TABLET | Refills: 3 | Status: SHIPPED | OUTPATIENT
Start: 2024-11-27 | End: 2024-11-27 | Stop reason: SDUPTHER

## 2024-11-27 RX ORDER — NEOMYCIN SULFATE, POLYMYXIN B SULFATE, HYDROCORTISONE 3.5; 10000; 1 MG/ML; [USP'U]/ML; MG/ML
3 SOLUTION/ DROPS AURICULAR (OTIC) 4 TIMES DAILY
Qty: 10 ML | Refills: 0 | Status: SHIPPED | OUTPATIENT
Start: 2024-11-27

## 2024-11-27 NOTE — TELEPHONE ENCOUNTER
Rx Refill Note  Requested Prescriptions     Pending Prescriptions Disp Refills    esomeprazole (nexIUM) 40 MG capsule 90 capsule 3     Sig: Take 1 capsule by mouth Every Morning Before Breakfast.      Last office visit with prescribing clinician: 11/27/2024   Last telemedicine visit with prescribing clinician: Visit date not found   Next office visit with prescribing clinician: 11/27/2024                         Would you like a call back once the refill request has been completed: [] Yes [] No    If the office needs to give you a call back, can they leave a voicemail: [] Yes [] No    Jose Antonio Lee MA  11/27/24, 12:08 CST

## 2024-11-27 NOTE — TELEPHONE ENCOUNTER
Rx Refill Note  Requested Prescriptions     Pending Prescriptions Disp Refills    neomycin-polymyxin-hydrocortisone (CORTISPORIN) 3.5-59583-7 otic solution 10 mL 0     Sig: Administer 3 drops into both ears 4 (Four) Times a Day.      Last office visit with prescribing clinician: 11/27/2024   Last telemedicine visit with prescribing clinician: Visit date not found   Next office visit with prescribing clinician: 11/27/2024                         Would you like a call back once the refill request has been completed: [] Yes [] No    If the office needs to give you a call back, can they leave a voicemail: [] Yes [] No    Jose Antonio Lee MA  11/27/24, 12:09 CST

## 2024-11-27 NOTE — PROGRESS NOTES
Subjective   The ABCs of the Annual Wellness Visit  Medicare Wellness Visit      Kathe Brito is a 68 y.o. patient who presents for a Medicare Wellness Visit.    The following portions of the patient's history were reviewed and   updated as appropriate: allergies, current medications, past family history, past medical history, past social history, past surgical history, and problem list.    Compared to one year ago, the patient's physical   health is the same.  Compared to one year ago, the patient's mental   health is the same.    Recent Hospitalizations:  She was not admitted to the hospital during the last year.     Current Medical Providers:  Patient Care Team:  Williams Curiel DO as PCP - General (Internal Medicine)    Outpatient Medications Prior to Visit   Medication Sig Dispense Refill   • Bi-Est 50:50 0.5 mg 50:50 Biest 0.5mg/ml  Apply 1ml to skin once daily 30 g 5   • DHEA 10 mg Apply 1 application  topically to the appropriate area as directed Daily. DHEA 10mg daily 30 g 11   • esomeprazole (nexIUM) 40 MG capsule Take 1 capsule by mouth Every Morning Before Breakfast. 90 capsule 3   • Estriol 3 mg Estriol Vaginal Cream 3mg/ml. Insert 1ml intravaginally QHS for 7 nights, then twice weekly. 30 g 11   • neomycin-polymyxin-hydrocortisone (CORTISPORIN) 3.5-86575-7 otic solution Administer 3 drops into both ears 4 (Four) Times a Day. 10 mL 0   • NON FORMULARY Testosterone 2.5 mg/ml cream Apply 1ml to external vaginal area once daily 30 g 0   • Oxytocin, Vitamin E Apply 1 application  topically to the appropriate area as directed Daily. 30 g 11   • Progesterone 20 mg Progesterone 20mg/ml  Apply 1 ml to skin once daily 30 g 11   • Turmeric (QC TUMERIC COMPLEX PO) Take  by mouth Daily.     • cetirizine (zyrTEC) 10 MG tablet Take 1 tablet by mouth Daily. 90 tablet 3   • diclofenac (VOLTAREN) 75 MG EC tablet Take 1 tablet by mouth 2 (Two) Times a Day. 90 tablet 1   • hydrOXYzine pamoate (VISTARIL) 25 MG  "capsule Take 1 capsule by mouth 2 (Two) Times a Day. 90 capsule 1   • [START ON 12/7/2024] NON FORMULARY Testosterone 2.5 mg/ml cream Apply 1ml to external vaginal area once daily 30 g 0   • [START ON 1/7/2025] NON FORMULARY Testosterone 2.5 mg/ml cream Apply 1ml to external vaginal area once daily 30 g 0     No facility-administered medications prior to visit.     No opioid medication identified on active medication list. I have reviewed chart for other potential  high risk medication/s and harmful drug interactions in the elderly.      Aspirin is not on active medication list.  Aspirin use is not indicated based on review of current medical condition/s. Risk of harm outweighs potential benefits.  .    Patient Active Problem List   Diagnosis   • Gastroesophageal reflux disease without esophagitis   • Overweight (BMI 25.0-29.9)   • Bilateral foot pain   • Impaired glucose tolerance   • Dysuria     Advance Care Planning Advance Directive is not on file.  ACP discussion was held with the patient during this visit. Patient has an advance directive (not in EMR), copy requested.            Objective   Vitals:    11/27/24 0856   BP: 129/78   BP Location: Left arm   Patient Position: Sitting   Cuff Size: Other (Comment)   Pulse: 60   Resp: 16   Temp: 97 °F (36.1 °C)   TempSrc: Temporal   SpO2: 100%   Weight: 68.4 kg (150 lb 11.2 oz)   Height: 158.8 cm (62.5\")       Estimated body mass index is 27.12 kg/m² as calculated from the following:    Height as of this encounter: 158.8 cm (62.5\").    Weight as of this encounter: 68.4 kg (150 lb 11.2 oz).            Does the patient have evidence of cognitive impairment? No                                                                                                Health  Risk Assessment    Smoking Status:  Social History     Tobacco Use   Smoking Status Never   • Passive exposure: Never   Smokeless Tobacco Never     Alcohol Consumption:  Social History     Substance and Sexual " Activity   Alcohol Use Yes    Comment: occasionally        Fall Risk Screen  ALBERT Fall Risk Assessment was completed, and patient is at LOW risk for falls.Assessment completed on:2024    Depression Screening   Little interest or pleasure in doing things? Not at all   Feeling down, depressed, or hopeless? Not at all   PHQ-2 Total Score 0      Health Habits and Functional and Cognitive Screenin/27/2024     8:54 AM   Functional & Cognitive Status   Do you have difficulty preparing food and eating? No   Do you have difficulty bathing yourself, getting dressed or grooming yourself? No   Do you have difficulty using the toilet? No   Do you have difficulty moving around from place to place? No   Do you have trouble with steps or getting out of a bed or a chair? No   Current Diet Well Balanced Diet   Dental Exam Up to date   Eye Exam Up to date   Exercise (times per week) 7 times per week   Current Exercises Include Gardening;Hiking;House Cleaning;Walking;Yard Work   Do you need help using the phone?  No   Are you deaf or do you have serious difficulty hearing?  No   Do you need help to go to places out of walking distance? No   Do you need help shopping? No   Do you need help preparing meals?  No   Do you need help with housework?  No   Do you need help with laundry? No   Do you need help taking your medications? No   Do you need help managing money? No   Do you ever drive or ride in a car without wearing a seat belt? No   Have you felt unusual stress, anger or loneliness in the last month? No   Who do you live with? Spouse   If you need help, do you have trouble finding someone available to you? No   Have you been bothered in the last four weeks by sexual problems? No   Do you have difficulty concentrating, remembering or making decisions? No           Age-appropriate Screening Schedule:  Refer to the list below for future screening recommendations based on patient's age, sex and/or medical conditions.  Orders for these recommended tests are listed in the plan section. The patient has been provided with a written plan.    Health Maintenance List  Health Maintenance   Topic Date Due   • DXA SCAN  Never done   • MAMMOGRAM  01/26/2025   • COVID-19 Vaccine (7 - 2024-25 season) 11/29/2024 (Originally 9/1/2024)   • Pneumococcal Vaccine 65+ (1 of 1 - PCV) 11/27/2025 (Originally 12/19/2021)   • BMI FOLLOWUP  05/08/2025   • ANNUAL WELLNESS VISIT  11/27/2025   • TDAP/TD VACCINES (2 - Td or Tdap) 01/01/2027   • COLORECTAL CANCER SCREENING  02/02/2027   • HEPATITIS C SCREENING  Completed   • INFLUENZA VACCINE  Completed   • ZOSTER VACCINE  Completed   • PAP SMEAR  Discontinued                                                                                                                                                CMS Preventative Services Quick Reference  Risk Factors Identified During Encounter  Immunizations Discussed/Encouraged: Influenza and COVID19  Dental Screening Recommended  Vision Screening Recommended    The above risks/problems have been discussed with the patient.  Pertinent information has been shared with the patient in the After Visit Summary.  An After Visit Summary and PPPS were made available to the patient.    Follow Up:   Next Medicare Wellness visit to be scheduled in 1 year.         Additional E&M Note during same encounter follows:  Patient has additional, significant, and separately identifiable condition(s)/problem(s) that require work above and beyond the Medicare Wellness Visit     Chief Complaint  Medicare Wellness-subsequent and Obesity    Subjective   HPI  Kathe is also being seen today for an annual adult preventative physical exam.  and Kathe is also being seen today for additional medical problem/s.    Says overall she is feeling well.  She is on hormone replacement years with OB/GYN for the past 5 years.  She says it has improved her sex drive to has not made any other improvements.   "She feels fatigued and has not lost weight.  Has tried weight watchers but no other specific diet.  Is due for yearly labs today.  Has not had any thyroid disease or vitamin deficiencies that she knows of in the past that could be contributing to this problem.  She has had prediabetes in the past.  She is not on any specific exercise plan at the moment.  She does need some of her meds refilled.  Denies any adverse side effects and voices compliance.  Has an appointment scheduled within the next month to have mammogram and DEXA scan at [facility] in [place].  Otherwise she denies any acute problems or concerns at today's visit.    Review of symptoms-negative except as noted per HPI.  Denies chest pain, shortness of breath, breathing changes.  No swelling.  No GI symptoms.  No neurological symptoms.          Objective   Vital Signs:  /78 (BP Location: Left arm, Patient Position: Sitting, Cuff Size: Other (Comment))   Pulse 60   Temp 97 °F (36.1 °C) (Temporal)   Resp 16   Ht 158.8 cm (62.5\")   Wt 68.4 kg (150 lb 11.2 oz)   SpO2 100%   BMI 27.12 kg/m²   Physical Exam  Vitals reviewed.   Constitutional:       General: She is not in acute distress.     Appearance: Normal appearance. She is not ill-appearing.   Neck:      Thyroid: No thyroid mass, thyromegaly or thyroid tenderness.      Trachea: Trachea and phonation normal.   Cardiovascular:      Rate and Rhythm: Normal rate and regular rhythm.      Pulses: Normal pulses.      Heart sounds: Normal heart sounds. No murmur heard.     No friction rub. No gallop.   Pulmonary:      Effort: Pulmonary effort is normal. No respiratory distress.      Breath sounds: Normal breath sounds. No wheezing.   Musculoskeletal:      Cervical back: Normal range of motion and neck supple.   Lymphadenopathy:      Cervical: No cervical adenopathy.   Skin:     General: Skin is warm and dry.      Capillary Refill: Capillary refill takes less than 2 seconds. "   Neurological:      General: No focal deficit present.      Mental Status: She is alert and oriented to person, place, and time.   Psychiatric:         Mood and Affect: Mood normal.         Behavior: Behavior normal.         Thought Content: Thought content normal.         Judgment: Judgment normal.       The following data was reviewed by: ROWDY Soriano on 11/27/2024:        Assessment and Plan Additional age appropriate preventative wellness advice topics were discussed during today's preventative wellness exam(some topics already addressed during AWV portion of the note above):    Physical Activity: Advised cardiovascular activity 150 minutes per week as tolerated. (example brisk walk for 30 minutes, 5 days a week).     Nutrition: Discussed nutrition plan with patient. Information shared in after visit summary. Goal is for a well balanced diet to enhance overall health.     Injury Prevention Discussion:  Information shared in after visit summary.       Medicare annual wellness visit, subsequent  Repeat in 1 year.  Labs ordered for health maintenance.  Medication refill  Is refilled.  Overweight (BMI 25.0-29.9)  Patient's (Body mass index is 27.12 kg/m².) indicates that they are overweight with health conditions that include GERD . Weight is unchanged. BMI is above average; BMI management plan is completed. We discussed portion control, increasing exercise, Information on healthy weight added to patient's after visit summary, and ordering labs to make sure there is not an underlying cause such as hypothyroidism. .   Other fatigue  Labs ordered to further evaluate  Need for vaccination  Flu vaccination administered for health maintenance.  Prediabetes  A1c ordered.  Goal less than 5.7  Screening for hyperlipidemia  LDL ordered.  Goal less than 100  Bone disorder  Checking vitamin D level    Orders Placed This Encounter   Procedures   • Fluzone High-Dose 65+yrs (1072-0422)   • CBC (No Diff)     Standing  Status:   Future     Number of Occurrences:   1     Standing Expiration Date:   11/27/2025     Order Specific Question:   Release to patient     Answer:   Routine Release [[id number]]   • Comprehensive Metabolic Panel     Standing Status:   Future     Number of Occurrences:   1     Standing Expiration Date:   11/27/2025     Order Specific Question:   Release to patient     Answer:   Routine Release [[id number]]   • Hemoglobin A1c     Standing Status:   Future     Number of Occurrences:   1     Standing Expiration Date:   11/27/2025     Order Specific Question:   Release to patient     Answer:   Routine Release [[id number]]   • Lipid Panel     Standing Status:   Future     Number of Occurrences:   1     Standing Expiration Date:   11/27/2025     Order Specific Question:   Release to patient     Answer:   Routine Release [[id number]]   • Vitamin D,25-Hydroxy     Standing Status:   Future     Number of Occurrences:   1     Standing Expiration Date:   11/27/2025     Order Specific Question:   Release to patient     Answer:   Routine Release [[id number]]   • Vitamin B12     Standing Status:   Future     Number of Occurrences:   1     Standing Expiration Date:   11/27/2025     Order Specific Question:   Release to patient     Answer:   Routine Release [[id number]]   • Folate     Standing Status:   Future     Number of Occurrences:   1     Standing Expiration Date:   11/27/2025     Order Specific Question:   Release to patient     Answer:   Routine Release [[id number]]   • TSH     Standing Status:   Future     Number of Occurrences:   1     Standing Expiration Date:   11/27/2025     Order Specific Question:   Release to patient     Answer:   Routine Release [[id number]]     New Medications Ordered This Visit   Medications   • cetirizine (zyrTEC) 10 MG tablet     Sig: Take 1 tablet by mouth Daily.     Dispense:  90 tablet     Refill:  3   • diclofenac (VOLTAREN) 75 MG EC tablet     Sig: Take 1 tablet by mouth 2 (Two) Times  a Day.     Dispense:  180 tablet     Refill:  1   • hydrOXYzine pamoate (VISTARIL) 25 MG capsule     Sig: Take 1 capsule by mouth 2 (Two) Times a Day.     Dispense:  180 capsule     Refill:  1        I spent 30 minutes caring for Kathe on this date of service. This time includes time spent by me in the following activities:preparing for the visit, reviewing tests, obtaining and/or reviewing a separately obtained history, performing a medically appropriate examination and/or evaluation , counseling and educating the patient/family/caregiver, ordering medications, tests, or procedures, referring and communicating with other health care professionals , documenting information in the medical record, and independently interpreting results and communicating that information with the patient/family/caregiver  Follow Up   Return in about 6 months (around 5/27/2025) for Recheck.  Patient was given instructions and counseling regarding her condition or for health maintenance advice. Please see specific information pulled into the AVS if appropriate.    Patient or patient representative verbalized consent for the use of Ambient Listening during the visit with  ROWDY Soriano for chart documentation. 11/27/2024  09:06 CST

## 2024-11-27 NOTE — ASSESSMENT & PLAN NOTE
Patient's (Body mass index is 27.12 kg/m².) indicates that they are overweight with health conditions that include GERD . Weight is unchanged. BMI is above average; BMI management plan is completed. We discussed portion control, increasing exercise, Information on healthy weight added to patient's after visit summary, and ordering labs to make sure there is not an underlying cause such as hypothyroidism..

## 2024-11-27 NOTE — TELEPHONE ENCOUNTER
Rx Refill Note  Requested Prescriptions     Pending Prescriptions Disp Refills    cetirizine (zyrTEC) 10 MG tablet 90 tablet 3     Sig: Take 1 tablet by mouth Daily.      Last office visit with prescribing clinician: 11/27/2024   Last telemedicine visit with prescribing clinician: Visit date not found   Next office visit with prescribing clinician: Visit date not found                         Would you like a call back once the refill request has been completed: [] Yes [] No    If the office needs to give you a call back, can they leave a voicemail: [] Yes [] No    Jose Antonio Lee MA  11/27/24, 12:14 CST

## 2024-12-11 DIAGNOSIS — N95.1 MENOPAUSAL STATE: ICD-10-CM

## 2024-12-12 ENCOUNTER — OFFICE VISIT (OUTPATIENT)
Dept: OBSTETRICS AND GYNECOLOGY | Age: 69
End: 2024-12-12
Payer: MEDICARE

## 2024-12-12 DIAGNOSIS — N89.8 VAGINAL DRYNESS: ICD-10-CM

## 2024-12-12 DIAGNOSIS — N95.2 VAGINAL ATROPHY: ICD-10-CM

## 2024-12-12 DIAGNOSIS — N94.10 FEMALE DYSPAREUNIA: Primary | ICD-10-CM

## 2024-12-16 NOTE — PROGRESS NOTES
Kathe Brito is a 68 y.o. year old  presenting for Carol Megan Touch treatment 1.  The patient's indication for the procedure is dyspareunia, vaginal atrophy, and vaginal dryness.    Carol Megan Touch consent signed by the patient:  Yes    Patient placed in lithotomy position:  Yes    Topical benazacaine/lidocaine/tetracaine cream applied externally:  Yes    Protective eyewear given to patient:  Yes    Vagina swabbed to remove any discharge or creams:  Yes    Topical anesthetic cream removed:  Yes    There were no vitals taken for this visit.    Internal Treatment  Power 30 W  Dwell time 1000 micro sec Spacing 1000 micro m  Shape Square  Smart Stack  1   Density 6.4%  Size 100%  Scan Mode Normal   Fluency 2.20 J/cm 2  Ratio 10/10  Exposure Single   Pulse Energy 43.2 mJ  Aiming 30%  Emission  DP      External Treatment  Power 26 W  Dwell time 800 micro sec  Spacing 800 micro m  Shape Square  Smart Stack  1   Density 8.7 %  Size 100%  Scan Mode Normal   Fluency 1.93 J/cm 2  Ratio 10/10  Exposure Single   Pulse Energy 27.7 mJ  Aiming 30%  Emission  Smart Pulse      Cold pack applied externally for 3-5 minutes immediately following treatment:  Yes    Patient tolerated procedure: fairly well    Silicone applied to external skin after cooling:  Yes    Patient given Post-procedure instructions:  Yes    Patient will schedule to return for Carol Megan Touch treatment 2 in 6 weeks.    ROWDY Glass  December 15, 2024

## 2024-12-20 DIAGNOSIS — N95.1 MENOPAUSAL STATE: ICD-10-CM

## 2024-12-20 DIAGNOSIS — M85.88 OTHER SPECIFIED DISORDERS OF BONE DENSITY AND STRUCTURE, OTHER SITE: ICD-10-CM

## 2024-12-20 DIAGNOSIS — Z12.31 ENCOUNTER FOR SCREENING MAMMOGRAM FOR MALIGNANT NEOPLASM OF BREAST: ICD-10-CM

## 2025-01-23 ENCOUNTER — OFFICE VISIT (OUTPATIENT)
Dept: OBSTETRICS AND GYNECOLOGY | Age: 70
End: 2025-01-23
Payer: MEDICARE

## 2025-01-23 DIAGNOSIS — Z02.9 ENCOUNTER FOR ADMINISTRATIVE EXAMINATIONS, UNSPECIFIED: Primary | ICD-10-CM

## 2025-02-04 ENCOUNTER — OFFICE VISIT (OUTPATIENT)
Dept: OBSTETRICS AND GYNECOLOGY | Age: 70
End: 2025-02-04
Payer: MEDICARE

## 2025-02-04 ENCOUNTER — TELEPHONE (OUTPATIENT)
Dept: INTERNAL MEDICINE | Facility: CLINIC | Age: 70
End: 2025-02-04
Payer: MEDICARE

## 2025-02-04 VITALS
HEIGHT: 63 IN | BODY MASS INDEX: 25.87 KG/M2 | WEIGHT: 146 LBS | DIASTOLIC BLOOD PRESSURE: 80 MMHG | SYSTOLIC BLOOD PRESSURE: 142 MMHG

## 2025-02-04 DIAGNOSIS — Z79.890 HORMONE REPLACEMENT THERAPY (HRT): Primary | ICD-10-CM

## 2025-02-04 DIAGNOSIS — R68.82 LOW LIBIDO: ICD-10-CM

## 2025-02-04 NOTE — TELEPHONE ENCOUNTER
Caller: Kathe Brito    Relationship: Self    Best call back number: [phone]     What is the medical concern/diagnosis: FEET HAVE HURT FOR YEARS    What specialty or service is being requested: PODIATRY    What is the provider, practice or medical service name: [facility] FAX [phone]

## 2025-02-06 ENCOUNTER — OFFICE VISIT (OUTPATIENT)
Dept: INTERNAL MEDICINE | Facility: CLINIC | Age: 70
End: 2025-02-06
Payer: MEDICARE

## 2025-02-06 VITALS
HEART RATE: 76 BPM | DIASTOLIC BLOOD PRESSURE: 64 MMHG | BODY MASS INDEX: 26.74 KG/M2 | TEMPERATURE: 98.1 F | OXYGEN SATURATION: 95 % | HEIGHT: 63 IN | RESPIRATION RATE: 16 BRPM | WEIGHT: 150.9 LBS | SYSTOLIC BLOOD PRESSURE: 106 MMHG

## 2025-02-06 DIAGNOSIS — M21.619 BUNION: Primary | ICD-10-CM

## 2025-02-06 DIAGNOSIS — L60.0 INGROWN TOENAIL OF BOTH FEET: ICD-10-CM

## 2025-02-06 RX ORDER — NEOMYCIN SULFATE, POLYMYXIN B SULFATE, HYDROCORTISONE 3.5; 10000; 1 MG/ML; [USP'U]/ML; MG/ML
3 SOLUTION/ DROPS AURICULAR (OTIC) 4 TIMES DAILY
Qty: 10 ML | Refills: 2 | Status: SHIPPED | OUTPATIENT
Start: 2025-02-06

## 2025-02-06 NOTE — PROGRESS NOTES
"Chief Complaint  Bunions, Foot Pain, and Ingrown Toenail    Subjective    History of Present Illness      Patient presents to [facility] for   History of Present Illness   Patient states that she has a history of plantars fasciitis but she uses shoe inserts and this does seem to help.  However both of her second toes have developed a corn or a bunion.  She has tried to file them down herself and they keep returning.  She states that they are becoming more more painful.  Patient also states that both of her big toenails are starting to curve inwards.  They constantly tried to grow into the side of the toes no matter how she cuts on.  Patient is requesting a podiatry referral at this time.  History of Present Illness      Review of Systems    I have reviewed and agree with the HPI information as above.  Jackelyn Luis, APRN     Objective   Vital Signs:   /64 (BP Location: Left arm, Patient Position: Sitting, Cuff Size: Other (Comment))   Pulse 76   Temp 98.1 °F (36.7 °C) (Temporal)   Resp 16   Ht 158.8 cm (62.5\")   Wt 68.4 kg (150 lb 14.4 oz)   SpO2 95%   BMI 27.16 kg/m²            Physical Exam  Vitals and nursing note reviewed.   Constitutional:       Appearance: Normal appearance. She is well-developed.   HENT:      Head: Normocephalic and atraumatic.      Right Ear: Tympanic membrane, ear canal and external ear normal.      Left Ear: Tympanic membrane, ear canal and external ear normal.      Nose: Nose normal. No septal deviation, nasal tenderness or congestion.      Mouth/Throat:      Lips: Pink. No lesions.      Mouth: Mucous membranes are moist. No oral lesions.      Dentition: Normal dentition.      Pharynx: Oropharynx is clear. No pharyngeal swelling, oropharyngeal exudate or posterior oropharyngeal erythema.   Eyes:      General: Lids are normal. Vision grossly intact. No scleral icterus.        Right eye: No discharge.         Left eye: No discharge.    "   Extraocular Movements: Extraocular movements intact.      Conjunctiva/sclera: Conjunctivae normal.      Right eye: Right conjunctiva is not injected.      Left eye: Left conjunctiva is not injected.      Pupils: Pupils are equal, round, and reactive to light.   Neck:      Thyroid: No thyroid mass.      Trachea: Trachea normal.   Cardiovascular:      Rate and Rhythm: Normal rate and regular rhythm.      Heart sounds: Normal heart sounds. No murmur heard.     No gallop.   Pulmonary:      Effort: Pulmonary effort is normal.      Breath sounds: Normal breath sounds and air entry. No wheezing, rhonchi or rales.   Musculoskeletal:         General: No tenderness or deformity. Normal range of motion.      Cervical back: Full passive range of motion without pain, normal range of motion and neck supple.      Thoracic back: Normal.      Right lower leg: No edema.      Left lower leg: No edema.        Feet:    Feet:      Comments: Both big toenails curving inward  Skin:     General: Skin is warm and dry.      Coloration: Skin is not jaundiced.      Findings: No rash.   Neurological:      Mental Status: She is alert and oriented to person, place, and time.      Sensory: Sensation is intact.      Motor: Motor function is intact.      Coordination: Coordination is intact.      Gait: Gait is intact.      Deep Tendon Reflexes: Reflexes are normal and symmetric.   Psychiatric:         Mood and Affect: Mood and affect normal.         Behavior: Behavior normal.         Judgment: Judgment normal.                Result Review  Data Reviewed:                   Assessment and Plan      Diagnoses and all orders for this visit:    1. Bunion (Primary)  -     Ambulatory Referral to Podiatry    2. Ingrown toenail of both feet  -     Ambulatory Referral to Podiatry    Other orders  -     neomycin-polymyxin-hydrocortisone (CORTISPORIN) 3.5-49625-1 otic solution; Administer 3 drops into both ears 4 (Four) Times a Day.  Dispense: 10 mL; Refill:  2      Patient states that she has a history of plantars fasciitis but she uses shoe inserts and this does seem to help.  However both of her second toes have developed a corn or a bunion.  She has tried to file them down herself and they keep returning.  She states that they are becoming more more painful.  Patient also states that both of her big toenails are starting to curve inwards.  They constantly tried to grow into the side of the toes no matter how she cuts on.  Patient is requesting a podiatry referral at this time.  Plan  1.  Refer to podiatry for evaluation  Assessment & Plan          Follow Up   Return for Next scheduled follow up.  Patient was given instructions and counseling regarding her condition or for health maintenance advice. Please see specific information pulled into the AVS if appropriate.

## 2025-02-07 DIAGNOSIS — Z79.890 HORMONE REPLACEMENT THERAPY (HRT): Primary | ICD-10-CM

## 2025-02-12 DIAGNOSIS — Z79.890 HORMONE REPLACEMENT THERAPY (HRT): ICD-10-CM

## 2025-02-21 NOTE — PROGRESS NOTES
[facility] - PODIATRY    Today's Date: 2025     Patient Name: Kathe Brito  MRN: [id number]  CSN: [id number]  PCP: Williams Curiel DO  Referring Provider: Jackelyn Luis, *    SUBJECTIVE     Chief Complaint   Patient presents with    Establish Care     Williams Curiel DO 26   BUNION, Ingrown toenail of both feet - PROG NOTE 2.6.25   Pt states she is here today for bunions/lesions on bilateral second digits, PF in bilateral feet, and ingrown toenails on bilateral great toenails. Pain level 2/10.      HPI: Kathe Brito, a 69 y.o.female, comes to clinic as a(n) new patient complaining of ingrown toenail and complaining of toenail/callus issues. Patient has h/o acid reflux, arthritis, aortic defect, . Patient is non-diabetic. Here today with complaints of ingrown bilateral greater toenails that have been causing issue for the past year now.  Does try to trim the nails straight across however does like nails are continually becoming ingrown. Denies drainage but realys greater toes are often painful.  Reports a skin lesion to bilateral second toes that has developed over the past several years that is also uncomfortable at times. Notes she does try to pick them off or trim them with a pair of nail nippers when they get painful. Denies numbness or tingling in feet. Denies open wounds or sores today. Admits pain at 2/10 level. Relates previous treatment(s) including trimming her self . Denies any constitutional symptoms. No other pedal complaints at this time.    Past Medical History:   Diagnosis Date    Acid reflux     aortic defect     Arthritis     Seasonal allergies      Past Surgical History:   Procedure Laterality Date     SECTION      TONSILLECTOMY       Family History   Problem Relation Age of Onset    Osteoporosis Mother     Stroke Mother     Dementia Mother     Heart disease Father     Stroke Maternal Grandmother     Heart disease Maternal Grandfather      Stroke Paternal Grandmother     Breast cancer Neg Hx     Ovarian cancer Neg Hx     Uterine cancer Neg Hx     Colon cancer Neg Hx     Melanoma Neg Hx      Social History     Socioeconomic History    Marital status:    Tobacco Use    Smoking status: Never     Passive exposure: Never    Smokeless tobacco: Never   Vaping Use    Vaping status: Never Used   Substance and Sexual Activity    Alcohol use: Yes     Comment: occasionally     Drug use: Never    Sexual activity: Yes     Partners: Male     Birth control/protection: Post-menopausal     Allergies   Allergen Reactions    Fish-Derived Products Hives    Iodine Rash    Sunflower Oil Hives    Atorvastatin Myalgia    Nitrofurantoin Monohyd Macro Hives    Sulfa Antibiotics Hives    Silicone Rash and Unknown - High Severity     Current Outpatient Medications   Medication Sig Dispense Refill    Bi-Est 50:50 0.5 mg 50:50 Biest 0.5mg/ml  Apply 1ml to skin once daily 30 g 5    cetirizine (zyrTEC) 10 MG tablet Take 1 tablet by mouth Daily. 90 tablet 3    DHEA 10 mg Apply 1 application  topically to the appropriate area as directed Daily. DHEA 10mg daily 30 g 11    diclofenac (VOLTAREN) 75 MG EC tablet Take 1 tablet by mouth 2 (Two) Times a Day. 180 tablet 1    esomeprazole (nexIUM) 40 MG capsule Take 1 capsule by mouth Every Morning Before Breakfast. 90 capsule 3    Estriol 3 mg Apply 1 ml to skin once Daily 30 g 11    hydrOXYzine pamoate (VISTARIL) 25 MG capsule Take 1 capsule by mouth 2 (Two) Times a Day. 180 capsule 1    neomycin-polymyxin-hydrocortisone (CORTISPORIN) 3.5-07369-8 otic solution Administer 3 drops into both ears 4 (Four) Times a Day. 10 mL 2    Oxytocin, Vitamin E Apply 1 application  topically to the appropriate area as directed Daily. 30 g 11    Progesterone 20 mg Progesterone 20mg/ml  Apply 1 ml to skin once daily 30 g 11    Turmeric (QC TUMERIC COMPLEX PO) Take  by mouth Daily.       No current facility-administered medications for this visit.      Review of Systems   Constitutional:  Negative for activity change, chills and fever.   HENT:  Negative for congestion.    Respiratory:  Negative for chest tightness and shortness of breath.    Cardiovascular:  Negative for chest pain and leg swelling.   Gastrointestinal:  Negative for abdominal pain.   Musculoskeletal:  Positive for arthralgias.   Skin:  Negative for wound.        Ingrown toenails.  Calluses.   Neurological:  Negative for dizziness, weakness and numbness.   Hematological:  Does not bruise/bleed easily.   Psychiatric/Behavioral:  Negative for agitation, behavioral problems and confusion.        OBJECTIVE     Vitals:    02/27/25 0824   BP: 124/82   Pulse: 72   SpO2: 98%       PHYSICAL EXAM  GEN:   Accompanied by none.     Foot/Ankle Exam    GENERAL  Appearance:  appears stated age  Orientation:  AAOx3  Affect:  appropriate  Gait:  unimpaired  Assistance:  independent  Right shoe gear: casual shoe  Left shoe gear: casual shoe    VASCULAR     Right Foot Vascularity   Dorsalis pedis:  2+  Posterior tibial:  2+  Skin temperature:  warm  Edema grading:  None  CFT:  3  Pedal hair growth:  Present  Varicosities:  none     Left Foot Vascularity   Dorsalis pedis:  2+  Posterior tibial:  2+  Skin temperature:  warm  Edema grading:  None  CFT:  3  Pedal hair growth:  Present  Varicosities:  none     NEUROLOGIC     Right Foot Neurologic   Normal sensation    Light touch sensation: normal  Vibratory sensation: normal  Hot/Cold sensation: normal     Left Foot Neurologic   Normal sensation    Light touch sensation: normal  Vibratory sensation: normal  Hot/Cold sensation:  normal    MUSCULOSKELETAL     Right Foot Musculoskeletal   Ecchymosis:  none  Tenderness:  callous right foot, toe 1 tenderness and toe 2 tenderness    Arch:  Normal  Hallux valgus: Yes       Left Foot Musculoskeletal   Ecchymosis:  none  Tenderness:  callous left foot, toe 1 tenderness and toe 2 tenderness  Arch:  Normal    MUSCLE STRENGTH      Right Foot Muscle Strength   Foot dorsiflexion:  5  Foot plantar flexion:  5  Foot inversion:  5  Foot eversion:  5     Left Foot Muscle Strength   Foot dorsiflexion:  5  Foot plantar flexion:  5  Foot inversion:  5  Foot eversion:  5    RANGE OF MOTION     Right Foot Range of Motion   Foot and ankle ROM within normal limits       Left Foot Range of Motion   Foot and ankle ROM within normal limits      DERMATOLOGIC      Right Foot Dermatologic   Skin  Positive for corn.   Nails  1.  Positive for abnormal thickness, dystrophic nail and ingrown toenail. Negative for paronychia.     Left Foot Dermatologic   Skin  Positive for corn.   Nails  1.  Positive for abnormal thickness, dystrophic nail and ingrown toenail. Negative for paronychia.    Image:       RADIOLOGY/NUCLEAR:  No results found.    LABORATORY/CULTURE RESULTS:      PATHOLOGY RESULTS:       ASSESSMENT/PLAN     Diagnoses and all orders for this visit:    1. Pain around toenail (Primary)    2. Ingrown toenail of both feet    3. Dystrophic nail    4. Plantar keratosis    5. Foot callus      Comprehensive lower extremity examination and evaluation was performed.  Discussed findings and treatment plan including risks, benefits, and treatment options with patient in detail. Patient agreed with treatment plan.  After verbal consent obtained, nail(s) x2 debrided of length and thickness with nail nipper without incidence  After verbal consent obtained, calluses x4 pared utilizing dermal curette and/or scalpel without incidence  Patient may maintain nails and calluses at home utilizing emery board or pumice stone between visits as needed   Findings consistent with IGTN to thickened, dystrophic bilateral greater toenails.  No drainage erythema or other active SOI present today.  Discussed with patient several different treatment options for IGTN including keeping the nail trimmed at an appropriate length and maintaining at that length VS total/partial nail avulsion with  or without Matrixectomy. Discussed that anytime an avulsion is performed it does have potential to cause damage to the matrix. Damage to the matrix can result in the nail to return thickened or irregular. Also discussed that use of phenol for matrixectomy has ~5% chance of nail regrowth, despite application.   Refers to remain conservative today with trimming of the nails.  Recommend occasional warm Epsom salt foot soak as needed and to avoid tight fitting or narrow toebox shoes.  Findings consistent with hallux valgus deformity which is causing greater toes to override second toes.  Standing pressure and friction caused by the deformities have resulted in the development of callusing.  Discussed conservative measures including use of bunion shields, gel toe spacer, and wearing wider shoes, and oral and/or topical NSAIDs for pain relief versus surgical correction.   Again, patient prefers to remain conservative at this point in time.    Otherwise patient scheduled to return on an as-needed basis.  Encouraged to call sooner with any questions or concerns or if any new issues arise.    An After Visit Summary was printed and given to the patient at discharge, including (if requested) any available informative/educational handouts regarding diagnosis, treatment, or medications. All questions were answered to patient/family satisfaction. Should symptoms fail to improve or worsen they agree to call or return to clinic or to go to the Emergency Department. Discussed the importance of following up with any needed screening tests/labs/specialist appointments and any requested follow-up recommended by me today. Importance of maintaining follow-up discussed and patient accepts that missed appointments can delay diagnosis and potentially lead to worsening of conditions.  Return if symptoms worsen or fail to improve., or sooner if acute issues arise.  I spent 20 minutes caring for Kathe on this date of service. This time  includes time spent by me in the following activities: preparing for the visit, performing a medically appropriate examination and/or evaluation, counseling and educating the patient/family/caregiver, and documenting information in the medical record  I spent 5 minutes on the separately reported service of toenail and HPK debridement. This time is not included in the time used to support the E/M service also reported today.        This document has been electronically signed by ROWDY Marshall on February 27, 2025 15:53 CST

## 2025-02-27 ENCOUNTER — OFFICE VISIT (OUTPATIENT)
Age: 70
End: 2025-02-27
Payer: MEDICARE

## 2025-02-27 VITALS
OXYGEN SATURATION: 98 % | DIASTOLIC BLOOD PRESSURE: 82 MMHG | HEIGHT: 63 IN | SYSTOLIC BLOOD PRESSURE: 124 MMHG | BODY MASS INDEX: 26.58 KG/M2 | WEIGHT: 150 LBS | HEART RATE: 72 BPM

## 2025-02-27 DIAGNOSIS — L60.0 INGROWN TOENAIL OF BOTH FEET: ICD-10-CM

## 2025-02-27 DIAGNOSIS — Q82.8 PLANTAR KERATOSIS: ICD-10-CM

## 2025-02-27 DIAGNOSIS — L60.3 DYSTROPHIC NAIL: ICD-10-CM

## 2025-02-27 DIAGNOSIS — L84 FOOT CALLUS: ICD-10-CM

## 2025-02-27 DIAGNOSIS — M79.676 PAIN AROUND TOENAIL: Primary | ICD-10-CM

## 2025-03-21 ENCOUNTER — OFFICE VISIT (OUTPATIENT)
Dept: OBSTETRICS AND GYNECOLOGY | Age: 70
End: 2025-03-21

## 2025-03-21 DIAGNOSIS — N94.10 DYSPAREUNIA IN FEMALE: ICD-10-CM

## 2025-03-21 DIAGNOSIS — N39.3 SUI (STRESS URINARY INCONTINENCE, FEMALE): ICD-10-CM

## 2025-03-21 DIAGNOSIS — N89.8 VAGINAL DRYNESS: Primary | ICD-10-CM

## 2025-03-21 NOTE — PROGRESS NOTES
Procedure   Procedures   Kathe Brito is a 69 y.o. year old  presenting for Carol Megan Touch treatment 3.  The patient's indication for the procedure is dyspareunia, vaginal atrophy, and vaginal dryness.  Since beginning the series of three treatments, the patient reports mild improvement.    Carol Megan Touch consent signed by the patient:  Yes    Patient placed in lithotomy position:  Yes    Topical benazacaine/lidocaine/tetracaine cream applied externally:  Yes    Protective eyewear given to patient:  Yes    Vagina swabbed to remove any discharge or creams:  Yes    Topical anesthetic cream removed:  Yes    There were no vitals taken for this visit.    Internal Treatment  Power 30 W  Dwell time 1000 micro sec Spacing 1000 micro m  Shape Square  Smart Stack  3   Density 6.4%  Size 100%  Scan Mode Normal   Fluency 6.61 J/cm 2  Ratio 10/10  Exposure Single   Pulse Energy 129.6 mJ  Aiming 30%  Emission  DP      External Treatment  Power 26 W  Dwell time 800 micro sec  Spacing 800 micro m  Shape Square  Smart Stack  1   Density 8.7%  Size 100%  Scan Mode Normal   Fluency 1.93 J/cm 2  Ratio 10/10  Exposure Singe   Pulse Energy 27.7 mJ  Aiming 30%  Emission  Smart Pulse      Cold pack applied externally for 3-5 minutes immediately following treatment:  Yes    Patient tolerated procedure: well    Silicone applied to external skin after cooling:  Yes    Patient given Post-procedure instructions:  Yes    Patient will schedule to return for an office visit in 6 weeks to discuss her results.    Amairani Concepcion, APRN  2025

## 2025-03-31 ENCOUNTER — TELEPHONE (OUTPATIENT)
Dept: OBSTETRICS AND GYNECOLOGY | Age: 70
End: 2025-03-31
Payer: MEDICARE

## 2025-03-31 NOTE — TELEPHONE ENCOUNTER
Pt calling to report her medications were not sent to pharmacy and would like refills. Spoke with [facility] pharmacy and they reported they had DHEA 10mg, Estriol 3 mg and progesterone 20 mg but pt reported she would like testosterone as well. Last OV 2/04/25 and filemon sona 3/21/25. At last OV I see the discussion about possibly weaning hormone therapy. Testosterone was dc'd 1/7/25 . Please advise and I will update pt.

## 2025-04-30 ENCOUNTER — OFFICE VISIT (OUTPATIENT)
Dept: INTERNAL MEDICINE | Facility: CLINIC | Age: 70
End: 2025-04-30
Payer: MEDICARE

## 2025-04-30 ENCOUNTER — TELEPHONE (OUTPATIENT)
Dept: INTERNAL MEDICINE | Facility: CLINIC | Age: 70
End: 2025-04-30

## 2025-04-30 ENCOUNTER — LAB (OUTPATIENT)
Dept: LAB | Facility: HOSPITAL | Age: 70
End: 2025-04-30
Payer: MEDICARE

## 2025-04-30 VITALS
SYSTOLIC BLOOD PRESSURE: 120 MMHG | HEIGHT: 63 IN | TEMPERATURE: 97.8 F | DIASTOLIC BLOOD PRESSURE: 80 MMHG | HEART RATE: 75 BPM | OXYGEN SATURATION: 98 % | WEIGHT: 154.6 LBS | BODY MASS INDEX: 27.39 KG/M2

## 2025-04-30 DIAGNOSIS — R35.0 FREQUENT URINATION: Primary | ICD-10-CM

## 2025-04-30 DIAGNOSIS — R30.0 BURNING WITH URINATION: ICD-10-CM

## 2025-04-30 DIAGNOSIS — R35.0 FREQUENT URINATION: ICD-10-CM

## 2025-04-30 DIAGNOSIS — F41.9 ANXIETY: ICD-10-CM

## 2025-04-30 LAB
BILIRUB UR QL STRIP: NEGATIVE
CLARITY UR: CLEAR
COLOR UR: YELLOW
GLUCOSE UR STRIP-MCNC: NEGATIVE MG/DL
HGB UR QL STRIP.AUTO: NEGATIVE
KETONES UR QL STRIP: NEGATIVE
LEUKOCYTE ESTERASE UR QL STRIP.AUTO: NEGATIVE
NITRITE UR QL STRIP: NEGATIVE
PH UR STRIP.AUTO: 6 [PH] (ref 5–8)
PROT UR QL STRIP: NEGATIVE
SP GR UR STRIP: 1.01 (ref 1–1.03)
UROBILINOGEN UR QL STRIP: NORMAL

## 2025-04-30 PROCEDURE — 81003 URINALYSIS AUTO W/O SCOPE: CPT

## 2025-04-30 PROCEDURE — 99213 OFFICE O/P EST LOW 20 MIN: CPT | Performed by: INTERNAL MEDICINE

## 2025-04-30 RX ORDER — ALPRAZOLAM 0.5 MG
0.5 TABLET ORAL DAILY PRN
Qty: 5 TABLET | Refills: 0 | Status: SHIPPED | OUTPATIENT
Start: 2025-04-30

## 2025-04-30 NOTE — TELEPHONE ENCOUNTER
Caller: Kathe Brito    Relationship: Self    Best call back number: [phone]     What is the best time to reach you: ANYTIME    Who are you requesting to speak with (clinical staff, provider,  specific staff member): CLINICAL    What was the call regarding: KATHE WAS IN THIS MORNING. SHE DID A URINALYSIS WHILE THERE. SHE IS HEADING OUT OF [place] NOW AND REQUESTING THAT IF ANY MEDICATION IS SENT IN FROM THAT TEST IT IS SENT TO     [facility]  [address] [place] [phone]    Is it okay if the provider responds through MyChart: NO

## 2025-04-30 NOTE — PROGRESS NOTES
Chief Complaint   Patient presents with    Anxiety     Patient mentions getting ready to fly and will need something to help with symptoms    urinary problem     States she may have a bladder infection: frequent, urgent, and burning urination       History:  Kathe Brito is a 69 y.o. female who presents today for evaluation of the above problems.      HPI  History of Present Illness  The patient presents for evaluation of suspected bladder infection and claustrophobia.    Symptoms suggestive of a bladder infection include frequent urination, urgency, and a burning sensation during urination. These symptoms began a few days ago following a horseback riding incident where she was unable to hold in her stool and could not get clean for some time.    An international flight to [facility] is scheduled, which is expected to last approximately 8 hours. A history of claustrophobia, particularly in confined spaces such as airplane cabins, is reported. Xanax was effective in managing symptoms during her last international flight, although the dosage is not remembered. Xanax has not been used since that time. Domestic flights are typically manageable unless there are significant delays on the tarmac. One dose of Xanax is planned prior to the upcoming flight to assess its effects.      ROS:  Review of Systems  See above    Current Outpatient Medications:     Bi-Est 50:50 0.5 mg, 50:50 Biest 0.5mg/ml  Apply 1ml to skin once daily, Disp: 30 g, Rfl: 5    cetirizine (zyrTEC) 10 MG tablet, Take 1 tablet by mouth Daily., Disp: 90 tablet, Rfl: 3    DHEA 10 mg, Apply 1 application  topically to the appropriate area as directed Daily. DHEA 10mg daily, Disp: 30 g, Rfl: 11    diclofenac (VOLTAREN) 75 MG EC tablet, Take 1 tablet by mouth 2 (Two) Times a Day., Disp: 180 tablet, Rfl: 1    esomeprazole (nexIUM) 40 MG capsule, Take 1 capsule by mouth Every Morning Before Breakfast., Disp: 90 capsule, Rfl: 3    Estriol 3 mg, Apply 1 ml to skin  once Daily, Disp: 30 g, Rfl: 11    hydrOXYzine pamoate (VISTARIL) 25 MG capsule, Take 1 capsule by mouth 2 (Two) Times a Day., Disp: 180 capsule, Rfl: 1    neomycin-polymyxin-hydrocortisone (CORTISPORIN) 3.5-29305-5 otic solution, Administer 3 drops into both ears 4 (Four) Times a Day., Disp: 10 mL, Rfl: 2    Progesterone 20 mg, Progesterone 20mg/ml  Apply 1 ml to skin once daily, Disp: 30 g, Rfl: 11    Turmeric (QC TUMERIC COMPLEX PO), Take  by mouth Daily., Disp: , Rfl:     ALPRAZolam (Xanax) 0.5 MG tablet, Take 1 tablet by mouth Daily As Needed for Anxiety., Disp: 5 tablet, Rfl: 0    Oxytocin, Vitamin E, Apply 1 application  topically to the appropriate area as directed Daily. (Patient not taking: Reported on 4/30/2025), Disp: 30 g, Rfl: 11    Lab Results   Component Value Date    GLUCOSE 92 11/27/2024    BUN 18 11/27/2024    CREATININE 0.68 11/27/2024     11/27/2024    K 4.2 11/27/2024     11/27/2024    CALCIUM 9.1 11/27/2024    PROTEINTOT 7.4 11/27/2024    ALBUMIN 4.4 11/27/2024    ALT 21 11/27/2024    AST 20 11/27/2024    ALKPHOS 72 11/27/2024    BILITOT 0.4 11/27/2024    GLOB 3.0 11/27/2024    AGRATIO 1.5 11/27/2024    BCR 26.5 (H) 11/27/2024    ANIONGAP 10.0 11/27/2024    EGFR 95.6 11/27/2024       WBC   Date Value Ref Range Status   11/27/2024 9.18 3.40 - 10.80 10*3/mm3 Final     RBC   Date Value Ref Range Status   11/27/2024 4.55 3.77 - 5.28 10*6/mm3 Final     Hemoglobin   Date Value Ref Range Status   11/27/2024 13.8 12.0 - 15.9 g/dL Final     Hematocrit   Date Value Ref Range Status   11/27/2024 43.4 34.0 - 46.6 % Final     MCV   Date Value Ref Range Status   11/27/2024 95.4 79.0 - 97.0 fL Final     MCH   Date Value Ref Range Status   11/27/2024 30.3 26.6 - 33.0 pg Final     MCHC   Date Value Ref Range Status   11/27/2024 31.8 31.5 - 35.7 g/dL Final     RDW   Date Value Ref Range Status   11/27/2024 13.6 12.3 - 15.4 % Final     RDW-SD   Date Value Ref Range Status   11/27/2024 48.3 37.0 -  "54.0 fl Final     MPV   Date Value Ref Range Status   11/27/2024 9.1 6.0 - 12.0 fL Final     Platelets   Date Value Ref Range Status   11/27/2024 352 140 - 450 10*3/mm3 Final         OBJECTIVE:  Visit Vitals  /80 (BP Location: Left arm, Patient Position: Sitting, Cuff Size: Adult)   Pulse 75   Temp 97.8 °F (36.6 °C) (Temporal)   Ht 158.8 cm (62.5\")   Wt 70.1 kg (154 lb 9.6 oz)   SpO2 98%   BMI 27.83 kg/m²      Physical Exam  Vitals and nursing note reviewed.   Constitutional:       General: She is not in acute distress.     Appearance: Normal appearance. She is well-developed. She is not diaphoretic.      Comments: Pleasant   HENT:      Head: Normocephalic and atraumatic.   Eyes:      Pupils: Pupils are equal, round, and reactive to light.   Neck:      Thyroid: No thyromegaly.      Trachea: Phonation normal.   Pulmonary:      Effort: No respiratory distress.   Skin:     Coloration: Skin is not pale.      Findings: No erythema.   Neurological:      Mental Status: She is alert.   Psychiatric:         Behavior: Behavior normal.         Thought Content: Thought content normal.         Judgment: Judgment normal.           Assessment/Plan      Diagnoses and all orders for this visit:    1. Frequent urination (Primary)  -     Urinalysis With Culture If Indicated - Urine, Clean Catch; Future    2. Burning with urination  -     Urinalysis With Culture If Indicated - Urine, Clean Catch; Future    3. Anxiety  -     ALPRAZolam (Xanax) 0.5 MG tablet; Take 1 tablet by mouth Daily As Needed for Anxiety.  Dispense: 5 tablet; Refill: 0      Assessment & Plan  1.  Anxiety.  - History of claustrophobia, particularly during international flights.  - Prescription for Xanax 0.5 mg, consisting of 5 tablets, provided.  - Advised to take one tablet approximately 45 minutes to an hour before the flight to manage symptoms  - Prescription sent to [facility].    2. Suspected urinary tract infection (UTI).  - Reports frequent, urgent, and " burning urination, which started a couple of days ago after horseback riding.  - Urinalysis will be conducted to confirm the presence of a UTI.  - If urinalysis results are positive, an appropriate antibiotic will be prescribed.  - Allergic to MACROBID, SULFA; these will be avoided in antibiotic selection.      Return for Next scheduled follow up.      LORETA Whitaker MD  08:17 CDT  4/30/2025   Electronically signed    Patient or patient representative verbalized consent for the use of Ambient Listening during the visit with  MIMI Whitaker MD for chart documentation. 4/30/2025  08:34 CDT

## 2025-05-28 ENCOUNTER — OFFICE VISIT (OUTPATIENT)
Dept: INTERNAL MEDICINE | Facility: CLINIC | Age: 70
End: 2025-05-28
Payer: MEDICARE

## 2025-05-28 VITALS
SYSTOLIC BLOOD PRESSURE: 127 MMHG | HEART RATE: 72 BPM | HEIGHT: 62 IN | WEIGHT: 151 LBS | BODY MASS INDEX: 27.79 KG/M2 | DIASTOLIC BLOOD PRESSURE: 77 MMHG | OXYGEN SATURATION: 97 %

## 2025-05-28 DIAGNOSIS — I25.10 CORONARY ARTERY DISEASE INVOLVING NATIVE CORONARY ARTERY OF NATIVE HEART WITHOUT ANGINA PECTORIS: Primary | ICD-10-CM

## 2025-05-28 DIAGNOSIS — Z76.0 MEDICATION REFILL: ICD-10-CM

## 2025-05-28 DIAGNOSIS — F41.9 ANXIETY: ICD-10-CM

## 2025-05-28 DIAGNOSIS — K21.9 GASTROESOPHAGEAL REFLUX DISEASE WITHOUT ESOPHAGITIS: ICD-10-CM

## 2025-05-28 DIAGNOSIS — R73.02 IMPAIRED GLUCOSE TOLERANCE: ICD-10-CM

## 2025-05-28 RX ORDER — HYDROXYZINE PAMOATE 25 MG/1
25 CAPSULE ORAL 2 TIMES DAILY
Qty: 180 CAPSULE | Refills: 1 | Status: SHIPPED | OUTPATIENT
Start: 2025-05-28

## 2025-05-28 RX ORDER — NEOMYCIN SULFATE, POLYMYXIN B SULFATE AND HYDROCORTISONE 3.5; 10000; 1 MG/ML; [IU]/ML; MG/ML
3 SOLUTION AURICULAR (OTIC) 4 TIMES DAILY
Qty: 10 ML | Refills: 2 | Status: SHIPPED | OUTPATIENT
Start: 2025-05-28

## 2025-05-28 RX ORDER — ALPRAZOLAM 0.5 MG
0.5 TABLET ORAL DAILY PRN
Qty: 2 TABLET | Refills: 0 | Status: SHIPPED | OUTPATIENT
Start: 2025-05-28

## 2025-05-28 RX ORDER — DICLOFENAC SODIUM 75 MG/1
75 TABLET, DELAYED RELEASE ORAL 2 TIMES DAILY
Qty: 180 TABLET | Refills: 1 | Status: SHIPPED | OUTPATIENT
Start: 2025-05-28

## 2025-05-28 RX ORDER — ESOMEPRAZOLE MAGNESIUM 40 MG/1
40 CAPSULE, DELAYED RELEASE ORAL
Qty: 90 CAPSULE | Refills: 3 | Status: SHIPPED | OUTPATIENT
Start: 2025-05-28

## 2025-05-28 NOTE — PROGRESS NOTES
CC: f/u GERD    History:  Kathe Brito is a 69 y.o. female   History of Present Illness  The patient came in for a routine checkup.    She has made some changes to her diet, cutting out potatoes and starches, and has lost 3 pounds. Despite reducing bread and switching to whole grain pasta, her weight has stayed the same over the past year and a half.    She was prescribed Xanax, which helped her sleep during a recent 8 to 9-hour flight. She has claustrophobia and would like Xanax for an upcoming scans or flights.    In 2005, she was diagnosed with a high-riding left main coronary aorta by Dr. Whitaker by heart cath at [facility].           ROS:  Review of Systems   Constitutional:  Negative for chills, diaphoresis, fatigue and fever.   HENT:  Negative for congestion and sore throat.    Respiratory:  Negative for cough.    Cardiovascular:  Negative for chest pain.   Gastrointestinal:  Negative for abdominal pain, nausea and vomiting.   Genitourinary:  Negative for dysuria.   Musculoskeletal:  Negative for myalgias and neck pain.   Skin:  Negative for rash.   Neurological:  Negative for weakness, numbness and headaches.        reports that she has never smoked. She has never been exposed to tobacco smoke. She has never used smokeless tobacco. She reports current alcohol use. She reports that she does not use drugs.      Current Outpatient Medications:     Bi-Est 50:50 0.5 mg, 50:50 Biest 0.5mg/ml  Apply 1ml to skin once daily, Disp: 30 g, Rfl: 5    cetirizine (zyrTEC) 10 MG tablet, Take 1 tablet by mouth Daily., Disp: 90 tablet, Rfl: 3    DHEA 10 mg, Apply 1 application  topically to the appropriate area as directed Daily. DHEA 10mg daily, Disp: 30 g, Rfl: 11    diclofenac (VOLTAREN) 75 MG EC tablet, Take 1 tablet by mouth 2 (Two) Times a Day., Disp: 180 tablet, Rfl: 1    esomeprazole (nexIUM) 40 MG capsule, Take 1 capsule by mouth Every Morning Before Breakfast., Disp: 90 capsule, Rfl: 3    Estriol 3 mg, Apply 1 ml  "to skin once Daily, Disp: 30 g, Rfl: 11    hydrOXYzine pamoate (VISTARIL) 25 MG capsule, Take 1 capsule by mouth 2 (Two) Times a Day., Disp: 180 capsule, Rfl: 1    neomycin-polymyxin-hydrocortisone (CORTISPORIN) 3.5-19834-7 otic solution, Administer 3 drops into both ears 4 (Four) Times a Day., Disp: 10 mL, Rfl: 2    Oxytocin, Vitamin E, Apply 1 application  topically to the appropriate area as directed Daily., Disp: 30 g, Rfl: 11    Progesterone 20 mg, Progesterone 20mg/ml  Apply 1 ml to skin once daily, Disp: 30 g, Rfl: 11    Turmeric (QC TUMERIC COMPLEX PO), Take  by mouth Daily., Disp: , Rfl:     ALPRAZolam (Xanax) 0.5 MG tablet, Take 1 tablet by mouth Daily As Needed for Anxiety. (Patient not taking: Reported on 5/28/2025), Disp: 5 tablet, Rfl: 0    OBJECTIVE:  /77 (BP Location: Left arm, Patient Position: Sitting, Cuff Size: Adult)   Pulse 72   Ht 157.5 cm (62\")   Wt 68.5 kg (151 lb)   SpO2 97%   BMI 27.62 kg/m²    Physical Exam  Constitutional:       General: She is not in acute distress.  Cardiovascular:      Rate and Rhythm: Normal rate and regular rhythm.      Heart sounds: Normal heart sounds. No murmur heard.  Pulmonary:      Effort: Pulmonary effort is normal.      Breath sounds: Normal breath sounds. No wheezing.   Neurological:      Mental Status: She is alert and oriented to person, place, and time.      Gait: Gait normal.   Psychiatric:         Mood and Affect: Mood normal.         Behavior: Behavior normal.           Assessment/Plan     Diagnoses and all orders for this visit:    1. Coronary artery disease involving native coronary artery of native heart without angina pectoris (Primary)  -     CT Cardiac Calcium Score Without Dye; Future  -     Comprehensive Metabolic Panel; Future  -     Lipid Panel; Future  -     CBC (No Diff); Future  Reviewed heart cath with difficult access left main but with some likely intrinsic disease.  We discussed options including treating empirically as " though she has coronary disease, which I believe would be prudent.  This would include baby aspirin and statin therapy.  She would like to avoid medication if at all possible, so we will order a cardiac calcium CT.  If score is 0 and low risk, we would avoid therapy, but if there is even some degree of disease, medical treatment for CAD would be indicated.  She has allergy with myalgia to atorvastatin, so we would likely choose a lower risk of side effects such as rosuvastatin.    2. Gastroesophageal reflux disease without esophagitis  -     esomeprazole (nexIUM) 40 MG capsule; Take 1 capsule by mouth Every Morning Before Breakfast.  Dispense: 90 capsule; Refill: 3  Stable without exacerbation on PPI.    3. Medication refill  -     hydrOXYzine pamoate (VISTARIL) 25 MG capsule; Take 1 capsule by mouth 2 (Two) Times a Day.  Dispense: 180 capsule; Refill: 1  -     diclofenac (VOLTAREN) 75 MG EC tablet; Take 1 tablet by mouth 2 (Two) Times a Day.  Dispense: 180 tablet; Refill: 1    4. Anxiety  -     ALPRAZolam (Xanax) 0.5 MG tablet; Take 1 tablet by mouth Daily As Needed for Anxiety.  Dispense: 2 tablet; Refill: 0  PDMP data reviewed. Xanax filled for prn use with CT scan. Warned that she should not drive for 6 hours after taking this.     5. Impaired glucose tolerance  -     Hemoglobin A1c; Future    Other orders  -     neomycin-polymyxin-hydrocortisone (CORTISPORIN) 3.5-01081-6 otic solution; Administer 3 drops into both ears 4 (Four) Times a Day.  Dispense: 10 mL; Refill: 2    Labs prior to next visit.     An After Visit Summary was printed and given to the patient at discharge.  Return in about 6 months (around 11/28/2025) for Medicare Wellness.      Patient or patient representative verbalized consent for the use of Ambient Listening during the visit with  Williams Curiel DO for chart documentation. 5/28/2025  14:22 CDT    Williams Curiel D.O. 5/28/2025   Electronically signed.

## 2025-06-11 ENCOUNTER — OFFICE VISIT (OUTPATIENT)
Age: 70
End: 2025-06-11
Payer: MEDICARE

## 2025-06-11 ENCOUNTER — TELEPHONE (OUTPATIENT)
Age: 70
End: 2025-06-11
Payer: MEDICARE

## 2025-06-11 VITALS
WEIGHT: 149 LBS | SYSTOLIC BLOOD PRESSURE: 110 MMHG | BODY MASS INDEX: 27.42 KG/M2 | DIASTOLIC BLOOD PRESSURE: 82 MMHG | HEIGHT: 62 IN

## 2025-06-11 DIAGNOSIS — R68.82 LOW LIBIDO: ICD-10-CM

## 2025-06-11 DIAGNOSIS — N89.8 VAGINAL DRYNESS: ICD-10-CM

## 2025-06-11 DIAGNOSIS — N94.10 DYSPAREUNIA IN FEMALE: ICD-10-CM

## 2025-06-11 DIAGNOSIS — Z12.31 SCREENING MAMMOGRAM, ENCOUNTER FOR: ICD-10-CM

## 2025-06-11 DIAGNOSIS — N95.1 MENOPAUSAL STATE: ICD-10-CM

## 2025-06-11 DIAGNOSIS — Z79.890 HORMONE REPLACEMENT THERAPY (HRT): Primary | ICD-10-CM

## 2025-06-11 RX ORDER — NYSTATIN AND TRIAMCINOLONE ACETONIDE 100000; 1 [USP'U]/G; MG/G
OINTMENT TOPICAL
Qty: 60 G | Refills: 1 | Status: SHIPPED | OUTPATIENT
Start: 2025-06-11

## 2025-06-11 RX ORDER — BENZONATATE 100 MG/1
CAPSULE ORAL
COMMUNITY
Start: 2025-06-03

## 2025-06-11 RX ORDER — DOXYCYCLINE 100 MG/1
1 CAPSULE ORAL EVERY 12 HOURS SCHEDULED
COMMUNITY
Start: 2025-06-03

## 2025-06-11 RX ORDER — PREDNISONE 10 MG/1
1 TABLET ORAL EVERY 12 HOURS SCHEDULED
COMMUNITY
Start: 2025-06-03

## 2025-06-11 RX ORDER — ALBUTEROL SULFATE 90 UG/1
INHALANT RESPIRATORY (INHALATION)
COMMUNITY
Start: 2025-06-03

## 2025-06-11 NOTE — PROGRESS NOTES
Chief Complaint  Med Management (Pt is here for a yearly HRT recheck.  States that when she was seen last, she was told she needed to come off her HRT medication but was not given instructions on how to come off of them or if anything else was going to be given.  C/o having hot flashes. )  History of Present Illness  The patient presents for evaluation of hormone replacement therapy.    She has been on hormone replacement therapy for the past 5 years, with no alterations in dosage. She reports a decrease in the efficacy of the therapy but expresses a desire to maintain sexual activity. Prior to initiating hormone replacement therapy, she experienced vaginal stiffness. She has been off testosterone for 3 months and continues to experience severe hot flashes. She also reports a decrease in libido.   She has been using minimal amounts of her medications due to difficulties in obtaining refills. She does not use coconut oil and requires water-based lubricants for intercourse. She uses oxytocin during intercourse and finds sildenafil beneficial.    She has undergone the Johnie procedure and now experiences urinary incontinence. She reports that intercourse was not painful prior to the procedure, provided she used lubricant. Post-procedure, she experiences pain during intercourse and is unsure if this is a temporary or permanent issue. She applies creams to the tops of her legs and reports no issues with vaginal or labial yeast. She has not used Mycolog cream.    She reports no history of myocardial infarction, stroke, or thromboembolic events. She has a lifelong history of irregular heartbeats, with no recent changes or medication adjustments. She experienced migraines during puberty and menopause but has not had any recent episodes.    Her blood glucose levels have been slightly elevated, as per doctors assessment, but she has been advised not to worry about it. She has made dietary modifications, including reducing  "her intake of potatoes and bread.    She has been experiencing severe hot flashes since her last visit, which she attributes to inconsistent use of her hormone replacement therapy due to difficulties in obtaining refills.    She takes Vistaril nightly for sleep and Xanax as needed for anxiety, particularly during overseas flights.        Subjective          Kathe Brito presents to [facility]  History of Present Illness    Review of Systems   Endocrine:        Hot flashes  Decreased libido         Objective   Vital Signs:   /82   Ht 157.5 cm (62\")   Wt 67.6 kg (149 lb)   BMI 27.25 kg/m²     Physical Exam  Vitals reviewed.   Constitutional:       Appearance: She is well-developed.   Eyes:      General:         Right eye: No discharge.         Left eye: No discharge.   Cardiovascular:      Rate and Rhythm: Normal rate and regular rhythm.   Pulmonary:      Effort: Pulmonary effort is normal.      Breath sounds: Normal breath sounds.   Skin:     General: Skin is warm.   Neurological:      Mental Status: She is alert and oriented to person, place, and time.   Psychiatric:         Behavior: Behavior normal.         Thought Content: Thought content normal.         Judgment: Judgment normal.       Result Review :   The following data was reviewed by: ROWDY Yost on 06/11/2025:  OBGYN Diagnostics Review:   Lab Results   Component Value Date    CASEREPORT  03/31/2021     Gynecologic Cytology Report                       Case: [id number]                                  Authorizing Provider:  Ivonne Beaver APRN Collected:           03/31/2021 09:30 AM          Ordering Location:     [facility]     Received:            03/31/2021 11:00 AM                                 [facility]                                                                 First Screen:          Lashanda Marlow                                                              Pathologist:     "       Radu Conrad MD                                                        Specimen:    Liquid-Based Pap, Screening, Cervix                                                        INTERPGYN (A) 03/31/2021     Atypical squamous cells of undetermined significance    GENCAT Epithelial cell abnormality, see interpretation 03/31/2021    SPECADGYN  03/31/2021     Satisfactory for evaluation, endocervical/transformation zone component absent    ADDINFO  03/31/2021     Disclaimer: Cervical cytology is a screening test primarily for squamous cancer and its precursors and has associated false-negative and false-positive results.  Technologies such as liquid-based preparations may decrease but will not eliminate all false-negative results.  Follow-up of unexplained clinical signs and symptoms is recommended to minimize false-negative results. (The [employer] System for Reporting Cervical Cytology: [employer], 2015).        HPV Aptima   Date Value Ref Range Status   03/31/2021 Not Detected Not Detected Final             Assessment & Plan  1. Hormone replacement therapy.  Hormone replacement therapy has been ongoing for several years without any changes in dosage. Reports indicate diminished benefits from the therapy and significant hot flashes and lower libido. The current regimen includes estrogen, progesterone, and DHEA.     - Reduce the dosage of estrogen and progesterone to a middle ground between the original and current doses.  - Use a pea-sized amount of coconut oil inside the vagina daily to help with vaginal dryness and tissue resilience.  - Apply Mycolog cream once daily for a week, then twice weekly on separate days for several weeks to address discomfort r/t tissue irritation.   - Refills for estrogen, progesterone, DHEA, oxytocin, and sildenafil provided.  - Obtain a report from the pharmacy to verify the dosages of all medications being used.    2. Hot flashes.  Significant hot flashes, particularly at night,  have worsened since the last visit. Hormone replacement has not been used as originally prescribed.    - Reduce the dosage of estrogen and progesterone to a middle ground to manage symptoms better.    3. Anxiety.  Uses Vistaril (hydroxyzine) most nights to aid sleep and has taken Xanax for an overseas flight. Discussed potential benefits of progesterone in aiding sleep.    4. Elevated blood sugar.  Reports slightly elevated blood sugar levels but has been advised by doctor not to worry about it. Dietary changes include reducing intake of potatoes and bread.    Follow-up  Follow up in 3 months.    Diagnoses and all orders for this visit:    1. Hormone replacement therapy (HRT) (Primary)    2. Vaginal dryness    3. Dyspareunia in female    4. Low libido    5. Menopausal state    6. Screening mammogram, encounter for  -     Mammo Screening Digital Tomosynthesis Bilateral With CAD; Future    Other orders  -     nystatin-triamcinolone (MYCOLOG) 404837-5.1 UNIT/GM-% ointment; Apply to affected area daily for a week then twice weekly for 3 months.  Dispense: 60 g; Refill: 1                  Follow Up   Return in about 3 months (around 9/11/2025).    Patient was given instructions and counseling regarding her condition or for health maintenance advice. Please see specific information pulled into the AVS if appropriate.       Patient or patient representative verbalized consent for the use of Ambient Listening during the visit with  ROWDY Yost for chart documentation. 6/22/2025  21:52 CDT

## 2025-06-11 NOTE — PROGRESS NOTES
See telephone encounter, updated to what medications patient has filled, when she has filled and refills left at the pharmacy per [facility].  Spoke with patient and updated her as well.

## 2025-06-11 NOTE — TELEPHONE ENCOUNTER
Per Svetlana at [facility]:  [facility] HRT:   Last filled 4/29/25(9 rf left of each)  *Progesterone 20mg per mL  *Estriol 3mg per mL   *DHEA 10mg per mL   Last filled 2/5/25(10 rf left of each)  *Sildenafil 2% / Arginine 6% - apply 1 mL externally before intercourse   *Oxytocin/Vitamin E cream          Called patient and let her know that [facility] does have refills of her compounds on file, she will just need to reach out to them to have filled.  Pt notified as well to keep three month f/u appointment.   
See HPI

## 2025-06-23 NOTE — PATIENT INSTRUCTIONS

## 2025-06-25 ENCOUNTER — HOSPITAL ENCOUNTER (OUTPATIENT)
Dept: CT IMAGING | Facility: HOSPITAL | Age: 70
Discharge: HOME | End: 2025-06-25
Admitting: INTERNAL MEDICINE

## 2025-06-25 DIAGNOSIS — I25.10 CORONARY ARTERY DISEASE INVOLVING NATIVE CORONARY ARTERY OF NATIVE HEART WITHOUT ANGINA PECTORIS: ICD-10-CM

## 2025-06-25 PROCEDURE — 75571 CT HRT W/O DYE W/CA TEST: CPT
